# Patient Record
Sex: MALE | Race: WHITE | NOT HISPANIC OR LATINO | Employment: OTHER | URBAN - METROPOLITAN AREA
[De-identification: names, ages, dates, MRNs, and addresses within clinical notes are randomized per-mention and may not be internally consistent; named-entity substitution may affect disease eponyms.]

---

## 2023-10-20 ENCOUNTER — APPOINTMENT (OUTPATIENT)
Dept: PRIMARY CARE | Facility: CLINIC | Age: 63
End: 2023-10-20
Payer: COMMERCIAL

## 2023-12-14 ENCOUNTER — APPOINTMENT (OUTPATIENT)
Dept: PRIMARY CARE | Facility: CLINIC | Age: 63
End: 2023-12-14
Payer: COMMERCIAL

## 2023-12-21 ENCOUNTER — APPOINTMENT (OUTPATIENT)
Dept: PRIMARY CARE | Facility: CLINIC | Age: 63
End: 2023-12-21
Payer: COMMERCIAL

## 2024-01-04 ENCOUNTER — APPOINTMENT (OUTPATIENT)
Dept: PRIMARY CARE | Facility: CLINIC | Age: 64
End: 2024-01-04
Payer: COMMERCIAL

## 2024-01-09 ENCOUNTER — OFFICE VISIT (OUTPATIENT)
Dept: PRIMARY CARE | Facility: CLINIC | Age: 64
End: 2024-01-09
Payer: COMMERCIAL

## 2024-01-09 VITALS
RESPIRATION RATE: 16 BRPM | HEART RATE: 84 BPM | BODY MASS INDEX: 30.86 KG/M2 | OXYGEN SATURATION: 96 % | SYSTOLIC BLOOD PRESSURE: 134 MMHG | WEIGHT: 192 LBS | DIASTOLIC BLOOD PRESSURE: 80 MMHG | HEIGHT: 66 IN

## 2024-01-09 DIAGNOSIS — Z72.0 NICOTINE ABUSE: Primary | ICD-10-CM

## 2024-01-09 DIAGNOSIS — E78.2 MIXED HYPERLIPIDEMIA: ICD-10-CM

## 2024-01-09 DIAGNOSIS — E11.69 TYPE 2 DIABETES MELLITUS WITH OTHER SPECIFIED COMPLICATION, WITH LONG-TERM CURRENT USE OF INSULIN (MULTI): ICD-10-CM

## 2024-01-09 DIAGNOSIS — D56.3 THALASSEMIA MINOR: ICD-10-CM

## 2024-01-09 DIAGNOSIS — K21.9 GASTROESOPHAGEAL REFLUX DISEASE WITHOUT ESOPHAGITIS: ICD-10-CM

## 2024-01-09 DIAGNOSIS — E78.5 HYPERLIPIDEMIA, UNSPECIFIED HYPERLIPIDEMIA TYPE: ICD-10-CM

## 2024-01-09 DIAGNOSIS — K21.9 GASTROESOPHAGEAL REFLUX DISEASE, UNSPECIFIED WHETHER ESOPHAGITIS PRESENT: ICD-10-CM

## 2024-01-09 DIAGNOSIS — E11.69 TYPE 2 DIABETES MELLITUS WITH OTHER SPECIFIED COMPLICATION, WITHOUT LONG-TERM CURRENT USE OF INSULIN (MULTI): ICD-10-CM

## 2024-01-09 DIAGNOSIS — K63.5 POLYP OF COLON, UNSPECIFIED PART OF COLON, UNSPECIFIED TYPE: ICD-10-CM

## 2024-01-09 DIAGNOSIS — M54.12 CERVICAL RADICULOPATHY: ICD-10-CM

## 2024-01-09 DIAGNOSIS — Z79.4 TYPE 2 DIABETES MELLITUS WITH OTHER SPECIFIED COMPLICATION, WITH LONG-TERM CURRENT USE OF INSULIN (MULTI): ICD-10-CM

## 2024-01-09 DIAGNOSIS — M54.2 CERVICAL PAIN (NECK): ICD-10-CM

## 2024-01-09 DIAGNOSIS — E83.119 HEMOCHROMATOSIS, UNSPECIFIED HEMOCHROMATOSIS TYPE: ICD-10-CM

## 2024-01-09 PROBLEM — D12.6 TUBULAR ADENOMA OF COLON: Status: ACTIVE | Noted: 2024-01-09

## 2024-01-09 PROBLEM — M79.2 RADICULAR PAIN IN LEFT ARM: Status: ACTIVE | Noted: 2024-01-09

## 2024-01-09 PROBLEM — M25.561 KNEE PAIN, RIGHT: Status: ACTIVE | Noted: 2024-01-09

## 2024-01-09 PROBLEM — R35.1 NOCTURIA: Status: ACTIVE | Noted: 2024-01-09

## 2024-01-09 PROBLEM — E11.9 TYPE 2 DIABETES MELLITUS (MULTI): Status: ACTIVE | Noted: 2024-01-09

## 2024-01-09 PROBLEM — R55 SYNCOPE: Status: ACTIVE | Noted: 2024-01-09

## 2024-01-09 PROBLEM — M17.12 PRIMARY OSTEOARTHRITIS OF LEFT KNEE: Status: ACTIVE | Noted: 2024-01-09

## 2024-01-09 PROBLEM — M17.11 PRIMARY OSTEOARTHRITIS OF RIGHT KNEE: Status: ACTIVE | Noted: 2024-01-09

## 2024-01-09 PROBLEM — R05.9 COUGH: Status: ACTIVE | Noted: 2024-01-09

## 2024-01-09 PROBLEM — R20.2 PARESTHESIA: Status: ACTIVE | Noted: 2024-01-09

## 2024-01-09 PROBLEM — M19.90 OSTEOARTHRITIS: Status: ACTIVE | Noted: 2024-01-09

## 2024-01-09 PROBLEM — M79.604 PAIN OF RIGHT LOWER EXTREMITY: Status: ACTIVE | Noted: 2024-01-09

## 2024-01-09 PROBLEM — R07.81 RIB PAIN ON RIGHT SIDE: Status: ACTIVE | Noted: 2024-01-09

## 2024-01-09 PROBLEM — K76.0 FATTY (CHANGE OF) LIVER, NOT ELSEWHERE CLASSIFIED: Status: ACTIVE | Noted: 2024-01-09

## 2024-01-09 PROCEDURE — 3079F DIAST BP 80-89 MM HG: CPT | Performed by: INTERNAL MEDICINE

## 2024-01-09 PROCEDURE — 99204 OFFICE O/P NEW MOD 45 MIN: CPT | Performed by: INTERNAL MEDICINE

## 2024-01-09 PROCEDURE — 99406 BEHAV CHNG SMOKING 3-10 MIN: CPT | Performed by: INTERNAL MEDICINE

## 2024-01-09 PROCEDURE — 3075F SYST BP GE 130 - 139MM HG: CPT | Performed by: INTERNAL MEDICINE

## 2024-01-09 RX ORDER — GLIMEPIRIDE 1 MG/1
1 TABLET ORAL
COMMUNITY
End: 2024-01-09 | Stop reason: SDUPTHER

## 2024-01-09 RX ORDER — EXENATIDE 2 MG/.85ML
INJECTION, SUSPENSION, EXTENDED RELEASE SUBCUTANEOUS
COMMUNITY
End: 2024-01-09 | Stop reason: SDUPTHER

## 2024-01-09 RX ORDER — PANTOPRAZOLE SODIUM 40 MG/1
40 TABLET, DELAYED RELEASE ORAL DAILY
COMMUNITY
End: 2024-01-09 | Stop reason: SDUPTHER

## 2024-01-09 RX ORDER — BLOOD SUGAR DIAGNOSTIC
STRIP MISCELLANEOUS 2 TIMES DAILY
COMMUNITY
Start: 2020-02-12

## 2024-01-09 RX ORDER — METFORMIN HYDROCHLORIDE 500 MG/1
2000 TABLET, EXTENDED RELEASE ORAL DAILY
COMMUNITY
End: 2024-01-09 | Stop reason: SDUPTHER

## 2024-01-09 RX ORDER — ATORVASTATIN CALCIUM 10 MG/1
10 TABLET, FILM COATED ORAL DAILY
COMMUNITY
End: 2024-01-09 | Stop reason: SDUPTHER

## 2024-01-09 ASSESSMENT — PATIENT HEALTH QUESTIONNAIRE - PHQ9
SUM OF ALL RESPONSES TO PHQ9 QUESTIONS 1 & 2: 0
1. LITTLE INTEREST OR PLEASURE IN DOING THINGS: NOT AT ALL
2. FEELING DOWN, DEPRESSED OR HOPELESS: NOT AT ALL

## 2024-01-09 ASSESSMENT — SOCIAL DETERMINANTS OF HEALTH (SDOH)
WITHIN THE LAST YEAR, HAVE YOU BEEN KICKED, HIT, SLAPPED, OR OTHERWISE PHYSICALLY HURT BY YOUR PARTNER OR EX-PARTNER?: NO
WITHIN THE LAST YEAR, HAVE YOU BEEN AFRAID OF YOUR PARTNER OR EX-PARTNER?: NO
WITHIN THE LAST YEAR, HAVE YOU BEEN HUMILIATED OR EMOTIONALLY ABUSED IN OTHER WAYS BY YOUR PARTNER OR EX-PARTNER?: NO
WITHIN THE LAST YEAR, HAVE TO BEEN RAPED OR FORCED TO HAVE ANY KIND OF SEXUAL ACTIVITY BY YOUR PARTNER OR EX-PARTNER?: NO

## 2024-01-09 ASSESSMENT — PAIN SCALES - GENERAL: PAINLEVEL: 6

## 2024-01-10 RX ORDER — EXENATIDE 2 MG/.85ML
INJECTION, SUSPENSION, EXTENDED RELEASE SUBCUTANEOUS
Qty: 1 EACH | Refills: 5 | Status: SHIPPED | OUTPATIENT
Start: 2024-01-10 | End: 2024-05-13 | Stop reason: SDUPTHER

## 2024-01-10 RX ORDER — GLIMEPIRIDE 1 MG/1
1 TABLET ORAL
Qty: 90 TABLET | Refills: 2 | Status: SHIPPED | OUTPATIENT
Start: 2024-01-10 | End: 2024-10-06

## 2024-01-10 RX ORDER — PANTOPRAZOLE SODIUM 40 MG/1
40 TABLET, DELAYED RELEASE ORAL DAILY
Qty: 90 TABLET | Refills: 2 | Status: SHIPPED | OUTPATIENT
Start: 2024-01-10 | End: 2024-10-06

## 2024-01-10 RX ORDER — ATORVASTATIN CALCIUM 10 MG/1
10 TABLET, FILM COATED ORAL DAILY
Qty: 90 TABLET | Refills: 2 | Status: SHIPPED | OUTPATIENT
Start: 2024-01-10 | End: 2024-10-06

## 2024-01-10 RX ORDER — METFORMIN HYDROCHLORIDE 500 MG/1
2000 TABLET, EXTENDED RELEASE ORAL DAILY
Qty: 180 TABLET | Refills: 3 | Status: SHIPPED | OUTPATIENT
Start: 2024-01-10 | End: 2024-07-08

## 2024-01-11 DIAGNOSIS — Z00.00 ANNUAL PHYSICAL EXAM: Primary | ICD-10-CM

## 2024-01-11 RX ORDER — IBUPROFEN 200 MG
1 TABLET ORAL EVERY 24 HOURS
Qty: 30 PATCH | Refills: 0 | Status: SHIPPED | OUTPATIENT
Start: 2024-01-11 | End: 2024-02-10

## 2024-01-11 RX ORDER — IBUPROFEN 200 MG
1 TABLET ORAL EVERY 24 HOURS
Qty: 30 PATCH | Refills: 0 | Status: SHIPPED | OUTPATIENT
Start: 2024-02-11 | End: 2024-03-12

## 2024-01-11 RX ORDER — NICOTINE 7MG/24HR
1 PATCH, TRANSDERMAL 24 HOURS TRANSDERMAL EVERY 24 HOURS
Qty: 30 PATCH | Refills: 0 | Status: SHIPPED | OUTPATIENT
Start: 2024-03-13 | End: 2024-04-12

## 2024-01-11 RX ORDER — DM/P-EPHED/ACETAMINOPH/DOXYLAM 30-7.5/3
2 LIQUID (ML) ORAL AS NEEDED
Qty: 100 LOZENGE | Refills: 0 | Status: SHIPPED | OUTPATIENT
Start: 2024-01-11 | End: 2024-02-10

## 2024-01-11 NOTE — PROGRESS NOTES
"Khris Kennedy is a 63 y.o. male who presents for New Patient Visit.  Patient presents to [facility].  He was previously seeing Dr. Linn.  Patient complains of left shoulder pain along with tingling in his left hand.  No injuries or accidents.  He does have associated neck pain.  Discussed that patient may have radicular symptoms from his cervical spine.  Discussed options.  Patient would like to try physical therapy.  Patient has a history of diabetes mellitus type 2.  He does not recall his last hemoglobin A1c.  Patient's blood glucose has been 150-160 fasting.    He had a colonoscopy in 2023.  He gets them every 3 years.        Objective     /80 (BP Location: Right arm, Patient Position: Sitting, BP Cuff Size: Adult)   Pulse 84   Resp 16   Ht 1.676 m (5' 6\")   Wt 87.1 kg (192 lb)   SpO2 96%   BMI 30.99 kg/m²      Physical Exam  Constitutional:       Appearance: Normal appearance.   HENT:      Head: Normocephalic and atraumatic.      Nose: Nose normal.      Mouth/Throat:      Mouth: Mucous membranes are moist.      Pharynx: Oropharynx is clear.   Eyes:      Extraocular Movements: Extraocular movements intact.      Pupils: Pupils are equal, round, and reactive to light.   Cardiovascular:      Rate and Rhythm: Normal rate and regular rhythm.   Pulmonary:      Effort: No respiratory distress.      Breath sounds: No wheezing, rhonchi or rales.      Comments: Diminished breath sounds throughout  Abdominal:      General: Bowel sounds are normal. There is no distension.      Palpations: Abdomen is soft.      Tenderness: There is no abdominal tenderness. There is no guarding.   Musculoskeletal:      Right lower leg: No edema.      Left lower leg: No edema.   Skin:     General: Skin is warm and dry.   Neurological:      Mental Status: He is alert and oriented to person, place, and time. Mental status is at baseline.   Psychiatric:         Mood and Affect: Mood normal.         Behavior: Behavior " normal.         Assessment/Plan   Problem List Items Addressed This Visit       Cervical pain (neck)     Referral to physical therapy         Cervical radiculopathy     Referral to physical therapy         Colon polyps     Continue colonoscopies every 3 years         Esophageal reflux     Continue pantoprazole         Hemochromatosis     Monitor blood work         Hyperlipidemia     Continue atorvastatin         Thalassemia minor     Monitor blood work         Type 2 diabetes mellitus (CMS/HCC)     Continue current medications          Other Visit Diagnoses       Nicotine abuse    -  Primary    Relevant Medications    nicotine (Nicoderm CQ) 21 mg/24 hr patch    nicotine (Nicoderm CQ) 14 mg/24 hr patch (Start on 2/11/2024)    nicotine (Nicoderm CQ) 7 mg/24 hr patch (Start on 3/13/2024)    Other Relevant Orders    Referral to Physical Therapy          Smoking cessation discussed for 7 minutes.  Patient would like to try nicotine patch and gum  Return in 4 months for physical       Rohan Castillo,

## 2024-05-03 ENCOUNTER — LAB (OUTPATIENT)
Dept: LAB | Facility: LAB | Age: 64
End: 2024-05-03
Payer: COMMERCIAL

## 2024-05-03 DIAGNOSIS — Z00.00 ANNUAL PHYSICAL EXAM: ICD-10-CM

## 2024-05-03 LAB
25(OH)D3 SERPL-MCNC: 22 NG/ML (ref 30–100)
ALBUMIN SERPL BCP-MCNC: 4.4 G/DL (ref 3.4–5)
ALP SERPL-CCNC: 56 U/L (ref 33–136)
ALT SERPL W P-5'-P-CCNC: 22 U/L (ref 10–52)
ANION GAP SERPL CALC-SCNC: 13 MMOL/L (ref 10–20)
AST SERPL W P-5'-P-CCNC: 21 U/L (ref 9–39)
BILIRUB SERPL-MCNC: 0.7 MG/DL (ref 0–1.2)
BUN SERPL-MCNC: 13 MG/DL (ref 6–23)
CALCIUM SERPL-MCNC: 8.9 MG/DL (ref 8.6–10.3)
CHLORIDE SERPL-SCNC: 106 MMOL/L (ref 98–107)
CHOLEST SERPL-MCNC: 115 MG/DL (ref 0–199)
CHOLESTEROL/HDL RATIO: 2.2
CO2 SERPL-SCNC: 25 MMOL/L (ref 21–32)
CREAT SERPL-MCNC: 0.79 MG/DL (ref 0.5–1.3)
EGFRCR SERPLBLD CKD-EPI 2021: >90 ML/MIN/1.73M*2
ERYTHROCYTE [DISTWIDTH] IN BLOOD BY AUTOMATED COUNT: 20.1 % (ref 11.5–14.5)
EST. AVERAGE GLUCOSE BLD GHB EST-MCNC: 131 MG/DL
GLUCOSE SERPL-MCNC: 143 MG/DL (ref 74–99)
HBA1C MFR BLD: 6.2 %
HCT VFR BLD AUTO: 36.8 % (ref 41–52)
HDLC SERPL-MCNC: 51.3 MG/DL
HGB BLD-MCNC: 10.9 G/DL (ref 13.5–17.5)
LDLC SERPL CALC-MCNC: 54 MG/DL
MCH RBC QN AUTO: 17.7 PG (ref 26–34)
MCHC RBC AUTO-ENTMCNC: 29.6 G/DL (ref 32–36)
MCV RBC AUTO: 60 FL (ref 80–100)
NON HDL CHOLESTEROL: 64 MG/DL (ref 0–149)
NRBC BLD-RTO: 0.4 /100 WBCS (ref 0–0)
PLATELET # BLD AUTO: 173 X10*3/UL (ref 150–450)
POTASSIUM SERPL-SCNC: 4.1 MMOL/L (ref 3.5–5.3)
PROT SERPL-MCNC: 6.6 G/DL (ref 6.4–8.2)
PSA SERPL-MCNC: 1.19 NG/ML
RBC # BLD AUTO: 6.16 X10*6/UL (ref 4.5–5.9)
SODIUM SERPL-SCNC: 140 MMOL/L (ref 136–145)
TRIGL SERPL-MCNC: 50 MG/DL (ref 0–149)
TSH SERPL-ACNC: 1.88 MIU/L (ref 0.44–3.98)
VLDL: 10 MG/DL (ref 0–40)
WBC # BLD AUTO: 5.2 X10*3/UL (ref 4.4–11.3)

## 2024-05-03 PROCEDURE — 83036 HEMOGLOBIN GLYCOSYLATED A1C: CPT

## 2024-05-03 PROCEDURE — 85027 COMPLETE CBC AUTOMATED: CPT

## 2024-05-03 PROCEDURE — 36415 COLL VENOUS BLD VENIPUNCTURE: CPT

## 2024-05-03 PROCEDURE — 80061 LIPID PANEL: CPT

## 2024-05-03 PROCEDURE — 84443 ASSAY THYROID STIM HORMONE: CPT

## 2024-05-03 PROCEDURE — 82306 VITAMIN D 25 HYDROXY: CPT

## 2024-05-03 PROCEDURE — 84153 ASSAY OF PSA TOTAL: CPT

## 2024-05-03 PROCEDURE — 80053 COMPREHEN METABOLIC PANEL: CPT

## 2024-05-09 ENCOUNTER — OFFICE VISIT (OUTPATIENT)
Dept: PRIMARY CARE | Facility: CLINIC | Age: 64
End: 2024-05-09
Payer: COMMERCIAL

## 2024-05-09 VITALS
BODY MASS INDEX: 29.98 KG/M2 | RESPIRATION RATE: 16 BRPM | WEIGHT: 191 LBS | HEIGHT: 67 IN | SYSTOLIC BLOOD PRESSURE: 134 MMHG | DIASTOLIC BLOOD PRESSURE: 86 MMHG | HEART RATE: 68 BPM | OXYGEN SATURATION: 97 %

## 2024-05-09 DIAGNOSIS — K63.5 POLYP OF COLON, UNSPECIFIED PART OF COLON, UNSPECIFIED TYPE: ICD-10-CM

## 2024-05-09 DIAGNOSIS — Z12.2 SCREENING FOR LUNG CANCER: ICD-10-CM

## 2024-05-09 DIAGNOSIS — E83.119 HEMOCHROMATOSIS, UNSPECIFIED HEMOCHROMATOSIS TYPE: ICD-10-CM

## 2024-05-09 DIAGNOSIS — D56.3 THALASSEMIA MINOR: ICD-10-CM

## 2024-05-09 DIAGNOSIS — E55.9 VITAMIN D DEFICIENCY: Primary | ICD-10-CM

## 2024-05-09 DIAGNOSIS — E78.2 MIXED HYPERLIPIDEMIA: ICD-10-CM

## 2024-05-09 DIAGNOSIS — E11.69 TYPE 2 DIABETES MELLITUS WITH OTHER SPECIFIED COMPLICATION, WITHOUT LONG-TERM CURRENT USE OF INSULIN (MULTI): ICD-10-CM

## 2024-05-09 DIAGNOSIS — R09.81 CONGESTION OF NASAL SINUS: ICD-10-CM

## 2024-05-09 PROCEDURE — 99396 PREV VISIT EST AGE 40-64: CPT | Performed by: INTERNAL MEDICINE

## 2024-05-09 PROCEDURE — 3044F HG A1C LEVEL LT 7.0%: CPT | Performed by: INTERNAL MEDICINE

## 2024-05-09 PROCEDURE — 3075F SYST BP GE 130 - 139MM HG: CPT | Performed by: INTERNAL MEDICINE

## 2024-05-09 PROCEDURE — 3079F DIAST BP 80-89 MM HG: CPT | Performed by: INTERNAL MEDICINE

## 2024-05-09 PROCEDURE — 99214 OFFICE O/P EST MOD 30 MIN: CPT | Performed by: INTERNAL MEDICINE

## 2024-05-09 PROCEDURE — 3048F LDL-C <100 MG/DL: CPT | Performed by: INTERNAL MEDICINE

## 2024-05-09 RX ORDER — FLUTICASONE FUROATE 27.5 UG/1
1 SPRAY, METERED NASAL
Qty: 10 G | Refills: 5 | Status: SHIPPED | OUTPATIENT
Start: 2024-05-09 | End: 2025-05-09

## 2024-05-09 RX ORDER — VIT C/E/ZN/COPPR/LUTEIN/ZEAXAN 250MG-90MG
25 CAPSULE ORAL DAILY
Qty: 90 CAPSULE | Refills: 3 | Status: SHIPPED | OUTPATIENT
Start: 2024-05-09 | End: 2025-05-09

## 2024-05-10 NOTE — PROGRESS NOTES
"Khris Kennedy is a 63 y.o. male who presents for No chief complaint on file..  Patient presents for his yearly physical.  He has no acute complaints.  Lab work reviewed with patient.  His hemoglobin A1c is 6.2%.  He is compliant with his medications.  ASCVD calculated at 14.1%.  Despite lipids being well-controlled, patient does require statin.  Patient's last colonoscopy was in 2023 and showed multiple polyps.  He needs a repeat in 2026.  He has no acute issues or complaints.  No issues with bowels or bladder.  No issues with sleep.  Patient quit smoking since his last visit.        Objective     /86 (BP Location: Right arm, Patient Position: Sitting, BP Cuff Size: Adult)   Pulse 68   Resp 16   Ht 1.702 m (5' 7\")   Wt 86.6 kg (191 lb)   SpO2 97%   BMI 29.91 kg/m²      Physical Exam  Constitutional:       Appearance: Normal appearance.   HENT:      Head: Normocephalic and atraumatic.      Nose: Nose normal.      Mouth/Throat:      Mouth: Mucous membranes are moist.      Pharynx: Oropharynx is clear.   Eyes:      Extraocular Movements: Extraocular movements intact.      Pupils: Pupils are equal, round, and reactive to light.   Cardiovascular:      Rate and Rhythm: Normal rate and regular rhythm.   Pulmonary:      Effort: No respiratory distress.      Breath sounds: No wheezing, rhonchi or rales.      Comments: Diminished breath sounds throughout  Abdominal:      General: Bowel sounds are normal. There is no distension.      Palpations: Abdomen is soft.      Tenderness: There is no abdominal tenderness. There is no guarding.   Musculoskeletal:      Right lower leg: No edema.      Left lower leg: No edema.   Skin:     General: Skin is warm and dry.   Neurological:      Mental Status: He is alert and oriented to person, place, and time. Mental status is at baseline.   Psychiatric:         Mood and Affect: Mood normal.         Behavior: Behavior normal.     Assessment/Plan   Problem List Items " Addressed This Visit       Colon polyps     Repeat colonoscopy 2026         Hemochromatosis    Relevant Orders    CBC    Hyperlipidemia     Well controlled  Continue statin due to ASCVD         Thalassemia minor     Hemoglobin stable, continue to monitor  Continue current medications         Type 2 diabetes mellitus (Multi)     Well-controlled  Continue current medications         Relevant Orders    Hemoglobin A1C     Other Visit Diagnoses       Vitamin D deficiency    -  Primary    Relevant Medications    cholecalciferol (Vitamin D-3) 25 MCG (1000 UT) capsule    Screening for lung cancer        Relevant Orders    CT lung screening low dose    Congestion of nasal sinus        Relevant Medications    fluticasone (Flonase Sensimist) 27.5 mcg/actuation nasal spray          Will obtain a CT chest for lung cancer screening  Continue current medications  Start vitamin D  Return in 6 months for repeat CBC and hemoglobin A1c       Rohan Castillo, DO

## 2024-05-13 DIAGNOSIS — Z79.4 TYPE 2 DIABETES MELLITUS WITH OTHER SPECIFIED COMPLICATION, WITH LONG-TERM CURRENT USE OF INSULIN (MULTI): ICD-10-CM

## 2024-05-13 DIAGNOSIS — E11.69 TYPE 2 DIABETES MELLITUS WITH OTHER SPECIFIED COMPLICATION, WITH LONG-TERM CURRENT USE OF INSULIN (MULTI): ICD-10-CM

## 2024-05-13 RX ORDER — EXENATIDE 2 MG/.85ML
INJECTION, SUSPENSION, EXTENDED RELEASE SUBCUTANEOUS
Qty: 1 EACH | Refills: 5 | Status: SHIPPED | OUTPATIENT
Start: 2024-05-13 | End: 2024-05-15 | Stop reason: SDUPTHER

## 2024-05-15 DIAGNOSIS — Z79.4 TYPE 2 DIABETES MELLITUS WITH OTHER SPECIFIED COMPLICATION, WITH LONG-TERM CURRENT USE OF INSULIN (MULTI): ICD-10-CM

## 2024-05-15 DIAGNOSIS — E11.69 TYPE 2 DIABETES MELLITUS WITH OTHER SPECIFIED COMPLICATION, WITH LONG-TERM CURRENT USE OF INSULIN (MULTI): ICD-10-CM

## 2024-05-15 RX ORDER — EXENATIDE 2 MG/.85ML
INJECTION, SUSPENSION, EXTENDED RELEASE SUBCUTANEOUS
Qty: 1 EACH | Refills: 5 | Status: SHIPPED | OUTPATIENT
Start: 2024-05-15

## 2024-05-28 ENCOUNTER — APPOINTMENT (OUTPATIENT)
Dept: RADIOLOGY | Facility: CLINIC | Age: 64
End: 2024-05-28
Payer: COMMERCIAL

## 2024-06-07 ENCOUNTER — APPOINTMENT (OUTPATIENT)
Dept: RADIOLOGY | Facility: CLINIC | Age: 64
End: 2024-06-07
Payer: COMMERCIAL

## 2024-06-14 ENCOUNTER — APPOINTMENT (OUTPATIENT)
Dept: RADIOLOGY | Facility: CLINIC | Age: 64
End: 2024-06-14
Payer: COMMERCIAL

## 2024-07-05 ENCOUNTER — APPOINTMENT (OUTPATIENT)
Dept: RADIOLOGY | Facility: CLINIC | Age: 64
End: 2024-07-05
Payer: COMMERCIAL

## 2024-07-17 ENCOUNTER — HOSPITAL ENCOUNTER (OUTPATIENT)
Dept: RADIOLOGY | Facility: CLINIC | Age: 64
Discharge: HOME | End: 2024-07-17
Payer: COMMERCIAL

## 2024-07-17 DIAGNOSIS — Z12.2 SCREENING FOR LUNG CANCER: ICD-10-CM

## 2024-07-17 PROCEDURE — 71271 CT THORAX LUNG CANCER SCR C-: CPT

## 2024-09-05 DIAGNOSIS — K21.9 GASTROESOPHAGEAL REFLUX DISEASE, UNSPECIFIED WHETHER ESOPHAGITIS PRESENT: ICD-10-CM

## 2024-09-05 DIAGNOSIS — E78.5 HYPERLIPIDEMIA, UNSPECIFIED HYPERLIPIDEMIA TYPE: ICD-10-CM

## 2024-09-06 RX ORDER — PANTOPRAZOLE SODIUM 40 MG/1
40 TABLET, DELAYED RELEASE ORAL DAILY
Qty: 90 TABLET | Refills: 3 | Status: SHIPPED | OUTPATIENT
Start: 2024-09-06

## 2024-09-06 RX ORDER — ATORVASTATIN CALCIUM 10 MG/1
10 TABLET, FILM COATED ORAL DAILY
Qty: 90 TABLET | Refills: 3 | Status: SHIPPED | OUTPATIENT
Start: 2024-09-06 | End: 2025-09-01

## 2024-09-12 DIAGNOSIS — Z79.4 TYPE 2 DIABETES MELLITUS WITH OTHER SPECIFIED COMPLICATION, WITH LONG-TERM CURRENT USE OF INSULIN (MULTI): ICD-10-CM

## 2024-09-12 DIAGNOSIS — E11.69 TYPE 2 DIABETES MELLITUS WITH OTHER SPECIFIED COMPLICATION, WITH LONG-TERM CURRENT USE OF INSULIN (MULTI): ICD-10-CM

## 2024-09-13 RX ORDER — METFORMIN HYDROCHLORIDE 500 MG/1
2000 TABLET, EXTENDED RELEASE ORAL DAILY
Qty: 180 TABLET | Refills: 3 | Status: SHIPPED | OUTPATIENT
Start: 2024-09-13 | End: 2025-03-12

## 2024-10-17 DIAGNOSIS — K21.9 GASTROESOPHAGEAL REFLUX DISEASE, UNSPECIFIED WHETHER ESOPHAGITIS PRESENT: ICD-10-CM

## 2024-10-17 DIAGNOSIS — E78.5 HYPERLIPIDEMIA, UNSPECIFIED HYPERLIPIDEMIA TYPE: ICD-10-CM

## 2024-10-17 DIAGNOSIS — Z79.4 TYPE 2 DIABETES MELLITUS WITH OTHER SPECIFIED COMPLICATION, WITH LONG-TERM CURRENT USE OF INSULIN: ICD-10-CM

## 2024-10-17 DIAGNOSIS — E11.69 TYPE 2 DIABETES MELLITUS WITH OTHER SPECIFIED COMPLICATION, WITH LONG-TERM CURRENT USE OF INSULIN: ICD-10-CM

## 2024-10-18 RX ORDER — ATORVASTATIN CALCIUM 10 MG/1
10 TABLET, FILM COATED ORAL DAILY
Qty: 90 TABLET | Refills: 3 | Status: SHIPPED | OUTPATIENT
Start: 2024-10-18 | End: 2025-10-13

## 2024-10-18 RX ORDER — METFORMIN HYDROCHLORIDE 500 MG/1
2000 TABLET, EXTENDED RELEASE ORAL DAILY
Qty: 120 TABLET | Refills: 11 | Status: SHIPPED | OUTPATIENT
Start: 2024-10-18 | End: 2025-10-13

## 2024-10-18 RX ORDER — GLIMEPIRIDE 1 MG/1
1 TABLET ORAL
Qty: 90 TABLET | Refills: 3 | Status: SHIPPED | OUTPATIENT
Start: 2024-10-18 | End: 2025-10-13

## 2024-10-18 RX ORDER — EXENATIDE 2 MG/.85ML
INJECTION, SUSPENSION, EXTENDED RELEASE SUBCUTANEOUS
Qty: 1 EACH | Refills: 5 | Status: SHIPPED | OUTPATIENT
Start: 2024-10-18

## 2024-10-18 RX ORDER — PANTOPRAZOLE SODIUM 40 MG/1
40 TABLET, DELAYED RELEASE ORAL DAILY
Qty: 90 TABLET | Refills: 3 | Status: SHIPPED | OUTPATIENT
Start: 2024-10-18

## 2024-11-04 ENCOUNTER — LAB (OUTPATIENT)
Dept: LAB | Facility: LAB | Age: 64
End: 2024-11-04
Payer: COMMERCIAL

## 2024-11-04 DIAGNOSIS — E83.119 HEMOCHROMATOSIS, UNSPECIFIED HEMOCHROMATOSIS TYPE: ICD-10-CM

## 2024-11-04 DIAGNOSIS — E11.69 TYPE 2 DIABETES MELLITUS WITH OTHER SPECIFIED COMPLICATION, WITHOUT LONG-TERM CURRENT USE OF INSULIN: ICD-10-CM

## 2024-11-04 LAB
ERYTHROCYTE [DISTWIDTH] IN BLOOD BY AUTOMATED COUNT: 19.8 % (ref 11.5–14.5)
HCT VFR BLD AUTO: 36.3 % (ref 41–52)
HGB BLD-MCNC: 10.9 G/DL (ref 13.5–17.5)
MCH RBC QN AUTO: 17.9 PG (ref 26–34)
MCHC RBC AUTO-ENTMCNC: 30 G/DL (ref 32–36)
MCV RBC AUTO: 60 FL (ref 80–100)
NRBC BLD-RTO: 0 /100 WBCS (ref 0–0)
PLATELET # BLD AUTO: 144 X10*3/UL (ref 150–450)
RBC # BLD AUTO: 6.09 X10*6/UL (ref 4.5–5.9)
WBC # BLD AUTO: 6.8 X10*3/UL (ref 4.4–11.3)

## 2024-11-04 PROCEDURE — 83036 HEMOGLOBIN GLYCOSYLATED A1C: CPT

## 2024-11-04 PROCEDURE — 36415 COLL VENOUS BLD VENIPUNCTURE: CPT

## 2024-11-04 PROCEDURE — 85027 COMPLETE CBC AUTOMATED: CPT

## 2024-11-05 LAB
EST. AVERAGE GLUCOSE BLD GHB EST-MCNC: 148 MG/DL
HBA1C MFR BLD: 6.8 %

## 2024-11-12 ENCOUNTER — APPOINTMENT (OUTPATIENT)
Dept: PRIMARY CARE | Facility: CLINIC | Age: 64
End: 2024-11-12
Payer: COMMERCIAL

## 2024-11-12 VITALS
RESPIRATION RATE: 14 BRPM | HEART RATE: 87 BPM | OXYGEN SATURATION: 99 % | SYSTOLIC BLOOD PRESSURE: 156 MMHG | BODY MASS INDEX: 29.91 KG/M2 | WEIGHT: 191 LBS | DIASTOLIC BLOOD PRESSURE: 80 MMHG

## 2024-11-12 DIAGNOSIS — Z79.4 TYPE 2 DIABETES MELLITUS WITH OTHER SPECIFIED COMPLICATION, WITH LONG-TERM CURRENT USE OF INSULIN: ICD-10-CM

## 2024-11-12 DIAGNOSIS — D50.8 OTHER IRON DEFICIENCY ANEMIA: ICD-10-CM

## 2024-11-12 DIAGNOSIS — E11.69 TYPE 2 DIABETES MELLITUS WITH OTHER SPECIFIED COMPLICATION, WITH LONG-TERM CURRENT USE OF INSULIN: ICD-10-CM

## 2024-11-12 DIAGNOSIS — R68.89 FORGETFULNESS: Primary | ICD-10-CM

## 2024-11-12 PROCEDURE — 3044F HG A1C LEVEL LT 7.0%: CPT | Performed by: INTERNAL MEDICINE

## 2024-11-12 PROCEDURE — 3077F SYST BP >= 140 MM HG: CPT | Performed by: INTERNAL MEDICINE

## 2024-11-12 PROCEDURE — 99214 OFFICE O/P EST MOD 30 MIN: CPT | Performed by: INTERNAL MEDICINE

## 2024-11-12 PROCEDURE — 3079F DIAST BP 80-89 MM HG: CPT | Performed by: INTERNAL MEDICINE

## 2024-11-12 PROCEDURE — 3048F LDL-C <100 MG/DL: CPT | Performed by: INTERNAL MEDICINE

## 2024-11-12 RX ORDER — EXENATIDE 2 MG/.85ML
INJECTION, SUSPENSION, EXTENDED RELEASE SUBCUTANEOUS
Qty: 1 EACH | Refills: 11 | Status: SHIPPED | OUTPATIENT
Start: 2024-11-12

## 2024-11-12 RX ORDER — METFORMIN HYDROCHLORIDE 500 MG/1
2000 TABLET, EXTENDED RELEASE ORAL DAILY
Qty: 120 TABLET | Refills: 11 | Status: SHIPPED | OUTPATIENT
Start: 2024-11-12 | End: 2025-11-07

## 2024-11-12 ASSESSMENT — ENCOUNTER SYMPTOMS
LOSS OF SENSATION IN FEET: 0
OCCASIONAL FEELINGS OF UNSTEADINESS: 0
DEPRESSION: 0

## 2024-11-12 ASSESSMENT — PAIN SCALES - GENERAL: PAINLEVEL_OUTOF10: 0-NO PAIN

## 2024-11-23 NOTE — PROGRESS NOTES
"Subjective   Nicole Kennedy is a 64 y.o. male who presents for Follow-up (Pt is being seen for follow up visit ).  Patient presents for follow up of lab work.  Labs reviewed with patient.  Patient complains that \"sometimes I forget everything.\"  She denies depression.        Objective     /80   Pulse 87   Resp 14   Wt 86.6 kg (191 lb)   SpO2 99%   BMI 29.91 kg/m²      Physical Exam  Constitutional:       Appearance: Normal appearance.   HENT:      Head: Normocephalic and atraumatic.      Nose: Nose normal.      Mouth/Throat:      Mouth: Mucous membranes are moist.      Pharynx: Oropharynx is clear.   Eyes:      Extraocular Movements: Extraocular movements intact.      Pupils: Pupils are equal, round, and reactive to light.   Cardiovascular:      Rate and Rhythm: Normal rate and regular rhythm.   Pulmonary:      Effort: No respiratory distress.      Breath sounds: No wheezing, rhonchi or rales.      Comments: Diminished breath sounds throughout  Abdominal:      General: Bowel sounds are normal. There is no distension.      Palpations: Abdomen is soft.      Tenderness: There is no abdominal tenderness. There is no guarding.   Musculoskeletal:      Right lower leg: No edema.      Left lower leg: No edema.   Skin:     General: Skin is warm and dry.   Neurological:      Mental Status: He is alert and oriented to person, place, and time. Mental status is at baseline.   Psychiatric:         Mood and Affect: Mood normal.         Behavior: Behavior normal.     Assessment/Plan   Problem List Items Addressed This Visit       Type 2 diabetes mellitus    Relevant Medications    Bydureon BCise 2 mg/0.85 mL auto-injector    metFORMIN  mg 24 hr tablet    Other Relevant Orders    Hemoglobin A1C     Other Visit Diagnoses       Forgetfulness    -  Primary    Relevant Orders    Vitamin B12    Methylmalonic acid, serum    Folate    Syphilis Screen with Reflex    CBC    Other iron deficiency anemia        Relevant Orders "    Iron and TIBC    Ferritin          Follow up in 3 months       Rohan Castillo DO

## 2024-11-26 ENCOUNTER — LAB (OUTPATIENT)
Dept: LAB | Facility: LAB | Age: 64
End: 2024-11-26
Payer: COMMERCIAL

## 2024-11-26 DIAGNOSIS — D50.8 OTHER IRON DEFICIENCY ANEMIA: ICD-10-CM

## 2024-11-26 DIAGNOSIS — Z79.4 TYPE 2 DIABETES MELLITUS WITH OTHER SPECIFIED COMPLICATION, WITH LONG-TERM CURRENT USE OF INSULIN: ICD-10-CM

## 2024-11-26 DIAGNOSIS — R68.89 FORGETFULNESS: ICD-10-CM

## 2024-11-26 DIAGNOSIS — E11.69 TYPE 2 DIABETES MELLITUS WITH OTHER SPECIFIED COMPLICATION, WITH LONG-TERM CURRENT USE OF INSULIN: ICD-10-CM

## 2024-11-26 LAB
ERYTHROCYTE [DISTWIDTH] IN BLOOD BY AUTOMATED COUNT: 20.5 % (ref 11.5–14.5)
EST. AVERAGE GLUCOSE BLD GHB EST-MCNC: 137 MG/DL
HBA1C MFR BLD: 6.4 %
HCT VFR BLD AUTO: 40.1 % (ref 41–52)
HGB BLD-MCNC: 12.1 G/DL (ref 13.5–17.5)
IRON SATN MFR SERPL: 40 % (ref 25–45)
IRON SERPL-MCNC: 126 UG/DL (ref 35–150)
MCH RBC QN AUTO: 17.9 PG (ref 26–34)
MCHC RBC AUTO-ENTMCNC: 30.2 G/DL (ref 32–36)
MCV RBC AUTO: 59 FL (ref 80–100)
NRBC BLD-RTO: 0.4 /100 WBCS (ref 0–0)
PLATELET # BLD AUTO: 128 X10*3/UL (ref 150–450)
RBC # BLD AUTO: 6.77 X10*6/UL (ref 4.5–5.9)
TIBC SERPL-MCNC: 313 UG/DL (ref 240–445)
UIBC SERPL-MCNC: 187 UG/DL (ref 110–370)
WBC # BLD AUTO: 7 X10*3/UL (ref 4.4–11.3)

## 2024-11-26 PROCEDURE — 36415 COLL VENOUS BLD VENIPUNCTURE: CPT

## 2024-11-26 PROCEDURE — 83036 HEMOGLOBIN GLYCOSYLATED A1C: CPT

## 2024-11-26 PROCEDURE — 86780 TREPONEMA PALLIDUM: CPT

## 2024-11-26 PROCEDURE — 83921 ORGANIC ACID SINGLE QUANT: CPT

## 2024-11-26 PROCEDURE — 83540 ASSAY OF IRON: CPT

## 2024-11-26 PROCEDURE — 83550 IRON BINDING TEST: CPT

## 2024-11-26 PROCEDURE — 82728 ASSAY OF FERRITIN: CPT

## 2024-11-26 PROCEDURE — 82746 ASSAY OF FOLIC ACID SERUM: CPT

## 2024-11-26 PROCEDURE — 82607 VITAMIN B-12: CPT

## 2024-11-26 PROCEDURE — 85027 COMPLETE CBC AUTOMATED: CPT

## 2024-11-27 LAB
FERRITIN SERPL-MCNC: 628 NG/ML (ref 20–300)
FOLATE SERPL-MCNC: 11.2 NG/ML
TREPONEMA PALLIDUM IGG+IGM AB [PRESENCE] IN SERUM OR PLASMA BY IMMUNOASSAY: NONREACTIVE
VIT B12 SERPL-MCNC: 416 PG/ML (ref 211–911)

## 2024-11-30 LAB — METHYLMALONATE SERPL-SCNC: 0.15 UMOL/L (ref 0–0.4)

## 2024-12-11 ENCOUNTER — OFFICE VISIT (OUTPATIENT)
Dept: ORTHOPEDIC SURGERY | Facility: CLINIC | Age: 64
End: 2024-12-11
Payer: COMMERCIAL

## 2024-12-11 ENCOUNTER — APPOINTMENT (OUTPATIENT)
Dept: ORTHOPEDIC SURGERY | Facility: CLINIC | Age: 64
End: 2024-12-11
Payer: COMMERCIAL

## 2024-12-11 ENCOUNTER — HOSPITAL ENCOUNTER (OUTPATIENT)
Dept: RADIOLOGY | Facility: CLINIC | Age: 64
Discharge: HOME | End: 2024-12-11
Payer: COMMERCIAL

## 2024-12-11 VITALS — HEIGHT: 66 IN | WEIGHT: 191 LBS | BODY MASS INDEX: 30.7 KG/M2

## 2024-12-11 DIAGNOSIS — M25.512 ACUTE PAIN OF LEFT SHOULDER: ICD-10-CM

## 2024-12-11 DIAGNOSIS — M75.42 IMPINGEMENT SYNDROME OF LEFT SHOULDER: Primary | ICD-10-CM

## 2024-12-11 DIAGNOSIS — M75.82 ROTATOR CUFF TENDONITIS, LEFT: ICD-10-CM

## 2024-12-11 PROCEDURE — 3048F LDL-C <100 MG/DL: CPT | Performed by: FAMILY MEDICINE

## 2024-12-11 PROCEDURE — 3008F BODY MASS INDEX DOCD: CPT | Performed by: FAMILY MEDICINE

## 2024-12-11 PROCEDURE — 73030 X-RAY EXAM OF SHOULDER: CPT | Mod: LT

## 2024-12-11 PROCEDURE — 3044F HG A1C LEVEL LT 7.0%: CPT | Performed by: FAMILY MEDICINE

## 2024-12-11 PROCEDURE — 2500000004 HC RX 250 GENERAL PHARMACY W/ HCPCS (ALT 636 FOR OP/ED): Performed by: FAMILY MEDICINE

## 2024-12-11 PROCEDURE — 99213 OFFICE O/P EST LOW 20 MIN: CPT | Mod: 25 | Performed by: FAMILY MEDICINE

## 2024-12-11 PROCEDURE — 20610 DRAIN/INJ JOINT/BURSA W/O US: CPT | Mod: LT | Performed by: FAMILY MEDICINE

## 2024-12-11 PROCEDURE — 73030 X-RAY EXAM OF SHOULDER: CPT | Mod: LEFT SIDE | Performed by: RADIOLOGY

## 2024-12-11 PROCEDURE — 99203 OFFICE O/P NEW LOW 30 MIN: CPT | Performed by: FAMILY MEDICINE

## 2024-12-11 RX ORDER — LIDOCAINE HYDROCHLORIDE 20 MG/ML
2 INJECTION, SOLUTION INFILTRATION; PERINEURAL
Status: COMPLETED | OUTPATIENT
Start: 2024-12-11 | End: 2024-12-11

## 2024-12-11 RX ORDER — TRIAMCINOLONE ACETONIDE 40 MG/ML
40 INJECTION, SUSPENSION INTRA-ARTICULAR; INTRAMUSCULAR
Status: COMPLETED | OUTPATIENT
Start: 2024-12-11 | End: 2024-12-11

## 2024-12-11 NOTE — PROGRESS NOTES
History of Present Illness   Chief Complaint   Patient presents with    Left Shoulder - Pain     Pain for many years       The patient is 64 y.o. right-hand-dominant male  here with a complaint of left shoulder pain.  Onset of symptoms over the past 3 to 4 months, atraumatic in nature.  Patient with history of some more intermittent chronic left shoulder pain, had 2 prior shoulder arthroscopies which she thinks was greater than 10 years ago, did well after this, does not recall rotator cuff surgery, says that he had some bone spurs removed.  Over the past several years he has had some intermittent flareups of pain that responded well to conservative treatment.  He has been dealing with more consistent pain in his left shoulder for the past few months, he has pain with attempts at overhead activity, he feels like there is limited range of motion in his left shoulder.  He has pain laying on his shoulder at night.  He takes over-the-counter medications as needed for pain.  Patient is retired, used to work in construction.    Past Medical History:   Diagnosis Date    Personal history of other diseases of the respiratory system 10/24/2015    History of acute bronchitis    Type 2 diabetes mellitus without complications (Multi) 12/01/2022    Type 2 diabetes mellitus       Medication Documentation Review Audit       Reviewed by Karma Islas MA (Technologist) on 12/11/24 at 1554      Medication Order Taking? Sig Documenting Provider Last Dose Status   atorvastatin (Lipitor) 10 mg tablet [id number]  Take 1 tablet (10 mg) by mouth once daily. Rohan Castillo DO  Active   blood sugar diagnostic (CareSens N Test Strips) strip [id number]  twice a day. Historical Provider, MD  Active   Bydureon BCise 2 mg/0.85 mL auto-injector [id number]  inject 2 milligram subcutaneously every 7 days Rohan Castillo, DO  Active   cholecalciferol (Vitamin D-3) 25 MCG (1000 UT) capsule [id number]  Take 1 capsule (25 mcg) by mouth once  daily.   Patient not taking: Reported on 11/12/2024    Rohan Castillo DO  Active   fluticasone (Flonase Sensimist) 27.5 mcg/actuation nasal spray [id number]  Administer 1 spray into each nostril once daily.   Patient not taking: Reported on 11/12/2024    Rohan Castillo DO  Active   glimepiride (Amaryl) 1 mg tablet [id number]  Take 1 tablet (1 mg) by mouth once daily with breakfast. Rohan Castillo DO  Active   metFORMIN  mg 24 hr tablet [id number]  Take 4 tablets (2,000 mg) by mouth once daily. Rohan Castillo DO  Active   pantoprazole (ProtoNix) 40 mg EC tablet [id number]  Take 1 tablet (40 mg) by mouth once daily. Rohan Castillo DO  Active                    No Known Allergies    Social History     Socioeconomic History    Marital status:      Spouse name: Not on file    Number of children: Not on file    Years of education: Not on file    Highest education level: Not on file   Occupational History    Not on file   Tobacco Use    Smoking status: Every Day     Current packs/day: 1.00     Average packs/day: 1 pack/day for 45.9 years (45.9 ttl pk-yrs)     Types: Cigarettes     Start date: 1979    Smokeless tobacco: Never   Substance and Sexual Activity    Alcohol use: Not Currently     Alcohol/week: 4.0 standard drinks of alcohol     Types: 4 Cans of beer per week    Drug use: Never    Sexual activity: Not on file   Other Topics Concern    Not on file   Social History Narrative    Not on file     Social Drivers of Health     Financial Resource Strain: Not on file   Food Insecurity: Not on file   Transportation Needs: Not on file   Physical Activity: Not on file   Stress: Not on file   Social Connections: Not on file   Intimate Partner Violence: Not At Risk (1/9/2024)    Humiliation, Afraid, Rape, and Kick questionnaire     Fear of Current or Ex-Partner: No     Emotionally Abused: No     Physically Abused: No     Sexually Abused: No   Housing Stability: Not on file       Past  Surgical History:   Procedure Laterality Date    OTHER SURGICAL HISTORY  05/16/2022    Appendectomy    OTHER SURGICAL HISTORY  05/16/2022    Nasal septoplasty          Review of Systems   GENERAL: Negative  GI: Negative  MUSCULOSKELETAL: See HPI  SKIN: Negative  NEURO:  Negative     Physical Exam:    General/Constitutional: well appearing, no distress, appears stated age  HEENT: sclera clear  Respiratory: non labored breathing  Vascular: No edema, swelling or tenderness, except as noted in detailed exam.  Integumentary: No impressive skin lesions present, except as noted in detailed exam.  Neurological:  Alert and oriented   Psychological:  Normal mood and affect.  Musculoskeletal: Normal, except as noted in detailed exam and in HPI    Left shoulder: Normal appearance, no skin changes, no muscle atrophy.  There is some generalized tenderness palpation at the shoulder including the subacromial space.  He does have some limited active range of motion, forward flexion 120 degrees, abduction 110 degrees, internal rotation lumbar spine, external rotation 25 degrees.  Passive forward flexion to around 150 degrees with pain and guarding, passive abduction 130 degrees with pain and guarding, passive external rotation 30 degrees       Imaging: X-rays of left shoulder obtained today and independently reviewed, there is moderate degenerative changes of the AC joint, mild degenerative changes of the glenohumeral joint    L Inj/Asp: L subacromial bursa on 12/11/2024 4:38 PM  Indications: pain  Details: 22 G needle, posterior approach  Medications: 40 mg triamcinolone acetonide 40 mg/mL; 2 mL lidocaine 20 mg/mL (2 %)  Outcome: tolerated well, no immediate complications  Procedure, treatment alternatives, risks and benefits explained, specific risks discussed. Consent was given by the patient. Immediately prior to procedure a time out was called to verify the correct patient, procedure, equipment, support staff and site/side  marked as required. Patient was prepped and draped in the usual sterile fashion.               Assessment   1. Impingement syndrome of left shoulder  Referral to Physical Therapy      2. Acute pain of left shoulder  XR shoulder left 2+ views      3. Rotator cuff tendonitis, left  Referral to Physical Therapy            Plan: Left shoulder pain, symptoms consistent with rotator cuff impingement, there is some mild passive range of motion deficits but appears to be more guarding in nature, discussed potential for adhesive capsulitis.  Discussed further workup and treatment.  We did proceed with subacromial injection with Kenalog, referral to physical therapy was placed.  Patient will follow-up if symptoms are ongoing despite conservative management.

## 2024-12-17 NOTE — PROGRESS NOTES
Physical Therapy Evaluation and Treatment      Patient Name: Nicole Kennedy  MRN: [id number]  Today's Date: 12/18/2024  Time Calculation  Start Time: 1120  Stop Time: 1154  Time Calculation (min): 34 min    Therapy Diagnoses:    Problem List Items Addressed This Visit             ICD-10-CM    Impingement syndrome of left shoulder - Primary M75.42    Relevant Orders    Follow Up In Physical Therapy    Rotator cuff tendonitis, left M75.82    Relevant Orders    Follow Up In Physical Therapy       Visit #: 1     Insurance:   Payor: ANTHEM / Plan: ANTHEM HMP / Product Type: *No Product type* / EVAL $52.20 // VISITS ARE MED NEC NO AUTH NEEDED PAYS AT 80% DED MET OOP 3000.00 860.71 APPLIED   Authorization required: no  Authorized visits: na/  Authorized date range: n/a     General:  Reason for visit: L RTC tendonitis   Referred by: MD Kirsten Bartholomew MD appt:  n/a     Precautions:  none  PMH: Diabetes, neuropathy, liver disease; anemia 2 previous L shoulder arthroscopies > 10 years ago;   Fall Risk: None        Assessment:   Nicole Kennedy is a 64 y.o. year old male who participated in a physical therapy evaluation today due to complaint of L shoulder pain/stiffness. Patient reports pain/problem began approximately 6 months ago with insidious onset. Reports previous hx of 2 bone spur removal surgeries in L shoulder. States there is no pain at rest, but increases significantly with activity. Does report frequent n/t in L UE to fingertips. Reports sx have remained the same. States he received cortisone injection in L shoulder which did not help to relieve sx. The patient's findings are consistent with dx of L shoulder pain/stiffness 2/2 competing diagnoses of RTC related pain, frozen shoulder, and cervical radiculopathy. Their impairments include Pain, Active ROM, Passive ROM, Strength, Activity limitations, Decreased functional level, and Participation restrictions. Tissue irritability level is high. Due to these  "impairments the patient is limited in performing Reaching, Lifting, Dressing, Bathing, Sleep is interrupted., and Driving. Activity limitations and participations restrictions include not being able to perform ADLs without discomfort including house/yard work. Nicole will benefit from continued skilled physical therapy as well as development of a home exercise program to address current impairments and progress towards return to their prior level of function without limitations.    Rehab Potential: Good    Clinical Presentation:  Stable and/or uncomplicated characteristics    Level of Complexity: Low      Subjective:  Reason For Visit: Initial Evaluation  - CC: Patient presents to PT with complaint of L shoulder stiffness/pain  - DOI: approx. 6 months  - DOS: No surgery found  - MAR: Insidious onset  - IMAGING: x-ray available XR L shoulder 12/11/24 :\"IMPRESSION:  No acute abnormality. Mild AC osteoarthritis\"      - HX: Reports pain/problem began approximately 6 months ago with insidious onset. Reports previous hx of 2 bone spur removal surgeries in L shoulder. States there is no pain at rest, but increases significantly with activity. Does report frequent n/t in L UE to fingertips. Reports sx have remained the same. States he received cortisone injection in L shoulder which did not help to relieve sx.   - PAIN: CURRENT: (0/10); BEST: (0/10); WORST: (10/10); LOCATION: (L shoulder/neck); Description: sharp; n/t  - ALLEVIATES PAIN:  hot shower  - EXACERBATES PAIN: lifting, reaching, sleeping on L side    - PLOF: Patient reports no functional limitations prior to injury.   - Previous Interventions/Treatments: Injections  - FUNCTIONAL LIMITATIONS: Reaching, Lifting, Dressing, Bathing, Sleep is interrupted., and Driving  - LIVING ENVIRONMENT: reviewed and no concern  - WORK:  retired   - PATIENT'S GOAL:  reduce pain; be able to reach overhead           Red Flags: Do you have any of the following? Partially - N/T in L " UE  Fever/chills, unexplained weight changes, dizziness/fainting, unexplained change in bowel or bladder functions, unexplained malaise or muscle weakness, night pain/sweats, numbness or tingling    Objective:     Cervical AROM screen   Extension - WFL reproduced n/t in L hand   Flexion -  WFL reproduced n/t in L hand   Rotation R - WFL  Rotation L - WFL pain at end range    AROM  Right shoulder flexion:  WFL   Left shoulder flexion: 128 degrees p!   Right shoulder abduction: WFL   Left shoulder abduction: 110 degrees p! 10/10  Right shoulder ER: WFL   Left shoulder ER: 20 degrees   Right HBB: T7     Left HBB: L1  Right HBH: WFL  Left HBH: WFL    PROM  L shoulder flexion moderate limitation firm end feel with pain  L shoulder ABD moderate limitation firm end feel with pain  L shoulder ER - significant limitation firm end feel with pain      MMT  Deltoid flexion right: 5   Deltoid flexion left: 5/5 p!   Deltoid abduction right:  5   Deltoid abduction left: 5 p!   ER @ 0 degrees abduction right: 4+    ER @ 0 degrees abduction left: 4+ p!   IR @ 0 degrees abduction right: 5   IR @ 0 degrees abduction left: 5  ER @ 90 degrees abduction right:    ER @ 90 degrees abduction left: unable to maintain position 2/2 pain      Special Tests  Neer (+)  Hawkin's Norman (-)  Speed's test (-)  Biceps load test 2 L (+)  Painful resisted ER L (+)  Limited capsular pattern with PROM (flexion > ABD > ER) (+) L GH      Outcome Measures  Other Measures  Disability of Arm Shoulder Hand (DASH): 34       Goals:  Short-term goals: (2-4 weeks)  1.) Patient will report reduction in worst reported pain in last 48 hours by 2 points from IE. (IE 10/10)    Long-term goals:  1.) Independent with HEP to maintain improvements in functional capacity and promote return to PLOF.  2.) Reduce worst reported pain within last 48 hours to < 5/10 in order to allow patient to perform daily tasks without limitation/with decreased discomfort.  3.)  Improve R GH ABD/Flexion AROM to >/= 140 degrees in order to allow patient to reach overhead/behind without need for compensatory strategies.  4.) Improve R GH strength to >/= 4+/5 and pain free in order to allow patient to (return to PLOF, perform ADLs, perform perform healthful and wellness activities without limitation/with decreased discomfort).  5.) Report decrease in Quick DASH by greater than or equal to 10 points to meet the MCID (IE 34)        Treatment:   1) Initial evaluation - Low complexity (26 minutes)   2) Patient educated on POC, HEP, and current condition.   3) Treatment Performed:    Therapeutic Exercise:    8 min  L shoulder table slides flexion and ABD x10 5 sec holds each  L GH ER doorway stretch x2 20 sec holds  L UT stretch with towel x2 20 sec holds        4) HEP Provided (See Below)     Access Code: [email or web address]  URL: [email or web address]  Date: 12/18/2024  Prepared by: Justin Simon    Exercises  - Seated Shoulder Flexion Towel Slide at Table Top  - 1 x daily - 7 x weekly - 2 sets - 10 reps - 5 sec hold  - Seated Shoulder Abduction Towel Slide at Table Top  - 1 x daily - 7 x weekly - 2 sets - 10 reps - 5 sec hold  - Seated Upper Trapezius Stretch  - 1 x daily - 7 x weekly - 3 reps - 20-30 sec hold  - Standing Shoulder External Rotation Stretch in Doorway  - 1 x daily - 7 x weekly - 5 reps - 20-30 sec hold     Plan:   Utilize manual therapy techniques at neck and shoulder for decreased muscle guarding and modulation of radicular sx. Progress L GH strength and mobility per patient tolerance.   Recommended Treatment:  Therapeutic exercise, Manual therapy, Home program instruction and progression, Neuromuscular re-education, Therapeutic activities, Self care and home management, Instruction in activity modification, Electrical stimulation, Vasopneumatic device + cold, Cryotherapy, Dry Needling, and Vasopneumatic device    Recommended Visits: 1-2x/wk for (5 visits)     Patient  in agreement with POC.    Justin Simon, PT

## 2024-12-18 ENCOUNTER — EVALUATION (OUTPATIENT)
Dept: PHYSICAL THERAPY | Facility: CLINIC | Age: 64
End: 2024-12-18
Payer: COMMERCIAL

## 2024-12-18 DIAGNOSIS — M75.42 IMPINGEMENT SYNDROME OF LEFT SHOULDER: Primary | ICD-10-CM

## 2024-12-18 DIAGNOSIS — M75.82 ROTATOR CUFF TENDONITIS, LEFT: ICD-10-CM

## 2024-12-18 PROCEDURE — 97110 THERAPEUTIC EXERCISES: CPT | Mod: GP

## 2024-12-18 PROCEDURE — 97161 PT EVAL LOW COMPLEX 20 MIN: CPT | Mod: GP

## 2024-12-18 ASSESSMENT — PATIENT HEALTH QUESTIONNAIRE - PHQ9
2. FEELING DOWN, DEPRESSED OR HOPELESS: NOT AT ALL
SUM OF ALL RESPONSES TO PHQ9 QUESTIONS 1 AND 2: 0
1. LITTLE INTEREST OR PLEASURE IN DOING THINGS: NOT AT ALL

## 2024-12-19 ENCOUNTER — TELEPHONE (OUTPATIENT)
Dept: ORTHOPEDIC SURGERY | Facility: CLINIC | Age: 64
End: 2024-12-19
Payer: COMMERCIAL

## 2024-12-19 DIAGNOSIS — M75.42 IMPINGEMENT SYNDROME OF LEFT SHOULDER: Primary | ICD-10-CM

## 2024-12-19 RX ORDER — MELOXICAM 15 MG/1
15 TABLET ORAL DAILY
Qty: 30 TABLET | Refills: 0 | Status: SHIPPED | OUTPATIENT
Start: 2024-12-19 | End: 2025-01-18

## 2024-12-19 NOTE — TELEPHONE ENCOUNTER
Pt is requesting a prescribation for something for pain, stated after therapy he is a lot of pain.          [id number] - [place], [place] - [address]  [address] [address]  Phone: [phone] Fax: [phone]

## 2024-12-19 NOTE — TELEPHONE ENCOUNTER
Rx for meloxicam sent to pharmacy. Please advise patient not to take any other nsaids (motrin,ibuprofen, aleve) while taking this. He can take tylenol if needed.

## 2024-12-23 ENCOUNTER — APPOINTMENT (OUTPATIENT)
Dept: PHYSICAL THERAPY | Facility: CLINIC | Age: 64
End: 2024-12-23
Payer: COMMERCIAL

## 2024-12-23 ENCOUNTER — TREATMENT (OUTPATIENT)
Dept: PHYSICAL THERAPY | Facility: CLINIC | Age: 64
End: 2024-12-23
Payer: COMMERCIAL

## 2024-12-23 DIAGNOSIS — M75.82 ROTATOR CUFF TENDONITIS, LEFT: ICD-10-CM

## 2024-12-23 DIAGNOSIS — M75.42 IMPINGEMENT SYNDROME OF LEFT SHOULDER: Primary | ICD-10-CM

## 2024-12-23 PROCEDURE — 97140 MANUAL THERAPY 1/> REGIONS: CPT | Mod: GP

## 2024-12-23 PROCEDURE — 97110 THERAPEUTIC EXERCISES: CPT | Mod: GP

## 2024-12-23 NOTE — PROGRESS NOTES
PHYSICAL THERAPY TREATMENT NOTE    Patient Name:  Nicole Kennedy   Patient MRN: [id number]  Date: 12/23/24  Time Calculation  Start Time: 1559  Stop Time: 1640  Time Calculation (min): 41 min      Problem List Items Addressed This Visit             ICD-10-CM    Impingement syndrome of left shoulder - Primary M75.42    Rotator cuff tendonitis, left M75.82       Insurance:  Visit number: 2  Insurance Type: Payor: ANTHEM / Plan: ANTHEM HMP / Product Type: *No Product type* /  EVAL $52.20 // VISITS ARE MED NEC NO AUTH NEEDED PAYS AT 80% DED MET OOP 3000.00 860.71 APPLIED   Authorization required: no  Authorized visits: na/  Authorized date range: n/a      General:  Reason for visit: L RTC tendonitis   Referred by: Rc Carvajal MD   Next MD appt:  n/a      Precautions:  none  PMH: Diabetes, neuropathy, liver disease; anemia 2 previous L shoulder arthroscopies > 10 years ago;   Fall Risk: None    Assessment:  Patient demonstrating/reporting gradual progression towards established goals and improved functional capacity  Education:  Educated on shoulder pulley system; updated HEP  Patient required max cueing including verbal cueing, tactile cueing, and visual demonstration for correct performance of today's interventions. Patient displayed good retention of cueing with patient demonstrating improved performance with performed repetitions without repetition of cues from therapist.     Progress towards functional goals: Reduced pain level.  Response to interventions:  Tolerated treatment session well. Continued to verbalize mild to moderate increase in sx with end range stretching, but patient reported gradual decrease in sx intensity with continued stretching. Mild improvement in available AROM post manual techniques. NO pain with strengthening.   Justification for continued skilled care: To address remaining  "functional, objective and subjective deficits to allow the patient to return to full independence with ADLs. Skilled intervention required to improve ROM which will improve function. Assess effectiveness of HEP. Modify and progress home exercise program.    Plan:  Progress L GH strength and mobility per patient tolerance.    Subjective:   Nicole reports he feels like his condition is improving.  Progress towards functional goal:  Reduced pain level.   Pain (0-10): 8    HEP adherence / understanding: compliance with the instructed home exercises.    Objective:  L GH AROM  Flexion 130 degrees p! End range; 135 degrees   degrees  p! End range      Treatment Performed: (\"NP\" = Not Performed)   Access Code: [email or web address]    Therapeutic Exercise:     23 minutes  UBE 3 min fwd/ 3 min retro L2   Pulleys into flexion and ABD x20 each 5 sec holds  Shoulder extensions 2x12 GTB  Supine L GH flexion AAROM x10 3 sec holds        Manual Therapy:     18 minutes  left GH PROM into flexion, ABD, ER  left GH AP mobs Gr III-IV 3x15  L GH traction x5 30 sec holds       Neuromuscular Re-education:      minutes      Gait Training:            minutes      Aquatics:            minutes      Therapeutic Activity:      minutes      Gait Training:            minutes      Modalities:       Vasopneumatic Device       minutes  Electrical Stimulation          minutes  Ultrasound            minutes  Iontophoresis                     minutes  Cold Pack            minutes  Mechanical Traction           minutes    Self Care Home Management:    minutes    Canalith Reposition:          minutes     Education:          minutes    Other:       minutes      Evaluation Complexity: Low:   minutes; Moderate   minutes; Complex   minutes    Re-Evaluation:   minutes           "

## 2024-12-24 ENCOUNTER — TELEPHONE (OUTPATIENT)
Dept: RADIOLOGY | Facility: HOSPITAL | Age: 64
End: 2024-12-24
Payer: COMMERCIAL

## 2024-12-31 ENCOUNTER — TREATMENT (OUTPATIENT)
Dept: PHYSICAL THERAPY | Facility: CLINIC | Age: 64
End: 2024-12-31
Payer: COMMERCIAL

## 2024-12-31 DIAGNOSIS — M75.42 IMPINGEMENT SYNDROME OF LEFT SHOULDER: Primary | ICD-10-CM

## 2024-12-31 DIAGNOSIS — M75.82 ROTATOR CUFF TENDONITIS, LEFT: ICD-10-CM

## 2024-12-31 PROCEDURE — 97140 MANUAL THERAPY 1/> REGIONS: CPT | Mod: GP

## 2024-12-31 PROCEDURE — 97110 THERAPEUTIC EXERCISES: CPT | Mod: GP

## 2024-12-31 NOTE — PROGRESS NOTES
PHYSICAL THERAPY TREATMENT NOTE    Patient Name:  Nicole Kennedy   Patient MRN: [id number]  Date: 12/31/24  Time Calculation  Start Time: 1250  Stop Time: 1331  Time Calculation (min): 41 min      Problem List Items Addressed This Visit             ICD-10-CM    Impingement syndrome of left shoulder - Primary M75.42    Rotator cuff tendonitis, left M75.82         Insurance:  Visit number: 3  Insurance Type: Payor: ANTHEM / Plan: ANTHEM HMP / Product Type: *No Product type* /  EVAL $52.20 // VISITS ARE MED NEC NO AUTH NEEDED PAYS AT 80% DED MET OOP 3000.00 860.71 APPLIED   Authorization required: no  Authorized visits: na/  Authorized date range: n/a      General:  Reason for visit: L RTC tendonitis   Referred by: Rc Carvajal MD   Next MD appt:  n/a      Precautions:  none  PMH: Diabetes, neuropathy, liver disease; anemia 2 previous L shoulder arthroscopies > 10 years ago;   Fall Risk: None    Subjective:   Nicole reports he feels like his condition is improving. Patient reports  his mobility for his L shoulder is improving, but pain is not improved and still very bothersome. Reports daily activities and exercises consistently increase shoulder pain.    Pain (0-10): 8    HEP adherence / understanding: compliance with the instructed home exercises.    Assessment:   Patient demonstrating and reporting gradual progression towards established goals and improved functional capacity.   Education:  Re-instructed HEP for patient to perform with emphasis on slow tempo and keeping muscles relaxed as his pain seems to be linked to guarded muscle response.   Progress towards functional goals:  Has demonstrated limited improvement in shoulder mobility and no progress towards improved pain levels yet.   Response to interventions:  Tolerated treatment session well. Patient responded very well to manual techniques  "with patient reporting 0/10 pain afterwards and was able to tolerate strengthening well. Did report mild but tolerable discomfort in shoulder with OHP. Still 0/10 pain after session concluded.   Justification for continued skilled care: To address remaining functional, objective and subjective deficits to allow the patient to return to full independence with ADLs. Skilled intervention required to improve ROM which will improve function. Assess effectiveness of HEP. Modify and progress home exercise program. Reduce pain to improve function.    Plan:  Continue with manual techniques as needed for improved ROM and sx modulation 2/2 muscle guarding. Progress strength and mobility per patient tolerance with emphasis on slow tempo movements.    Objective:     L GH AROM  Flexion 132 degrees p! End range  ABD 90 degrees p! End range      Treatment Performed: (\"NP\" = Not Performed)   Access Code: [email or web address]    Therapeutic Exercise:     29 minutes  UBE 3 min fwd/ 3 min retro L2   Supine BILL GH flexion AAROM with dowel 2x12 3 sec holds slow tempo  Seated OHP with Dbs 3# each hand 2x6 3-1-3 tempo  L GH ER vs RTB 3x6 3-1-3 tempo  Shoulder pendulums 10# DB x2 30 sec circular pattern  Not performed today:  Pulleys into flexion and ABD x20 each 5 sec holds  Shoulder extensions 2x12 GTB  Reverse flys  ABD 3-1-3 tempo          Manual Therapy:     12 minutes  left GH PROM into flexion, ABD,  left GH AP mobs Gr III-IV 3x15  L GH traction x5 30 sec holds   R Sidelying L scapulothoracic mobilization glides in all directions 45 seconds    Neuromuscular Re-education:      minutes      Gait Training:            minutes      Aquatics:            minutes      Therapeutic Activity:      minutes      Gait Training:            minutes      Modalities:       Vasopneumatic Device       minutes  Electrical Stimulation          minutes  Ultrasound            minutes  Iontophoresis                     minutes  Cold Pack            minutes  Mechanical " Traction           minutes    Self Care Home Management:    minutes    Canalith Reposition:          minutes     Education:          minutes    Other:       minutes      Evaluation Complexity: Low:   minutes; Moderate   minutes; Complex   minutes    Re-Evaluation:   minutes

## 2025-01-02 ENCOUNTER — TREATMENT (OUTPATIENT)
Dept: PHYSICAL THERAPY | Facility: CLINIC | Age: 65
End: 2025-01-02
Payer: COMMERCIAL

## 2025-01-02 DIAGNOSIS — M75.42 IMPINGEMENT SYNDROME OF LEFT SHOULDER: ICD-10-CM

## 2025-01-02 DIAGNOSIS — M75.82 ROTATOR CUFF TENDONITIS, LEFT: ICD-10-CM

## 2025-01-02 PROCEDURE — 97140 MANUAL THERAPY 1/> REGIONS: CPT | Mod: GP

## 2025-01-02 PROCEDURE — 97110 THERAPEUTIC EXERCISES: CPT | Mod: GP

## 2025-01-02 NOTE — PROGRESS NOTES
PHYSICAL THERAPY TREATMENT NOTE    Patient Name:  Nicole Kennedy   Patient MRN: [id number]  Date: 01/02/25  Time Calculation  Start Time: 1031  Stop Time: 1110  Time Calculation (min): 39 min      Problem List Items Addressed This Visit             ICD-10-CM    Impingement syndrome of left shoulder M75.42    Rotator cuff tendonitis, left M75.82       Insurance:  Visit number: 4  Insurance Type: Payor: ANTHEM / Plan: ANTHEM HMP / Product Type: *No Product type* /  EVAL $52.20 // VISITS ARE MED NEC NO AUTH NEEDED PAYS AT 80% DED MET OOP 3000.00 860.71 APPLIED   Authorization required: no  Authorized visits: na/  Authorized date range: n/a      General:  Reason for visit: L RTC tendonitis   Referred by: MD Kirsten Bartholomew MD appt:  n/a      Precautions:  none  PMH: Diabetes, neuropathy, liver disease; anemia 2 previous L shoulder arthroscopies > 10 years ago;   Fall Risk: None    Subjective:   Nicole reports he feels like his condition is improving. Patient reports  he did some exercise this morning so his shoulder is a little sore, but not as bad as it used to be. Feels pain levels overall are better.    Pain (0-10): 5    HEP adherence / understanding: compliance with the instructed home exercises.    Assessment:   Patient demonstrating limited progression towards established goals and improved functional capacity, but patient is reporting good improvements in pain levels overall. Based on presentation of sx and response to interventions, patient likely dealing with combination of RTC related shoulder pain and frozen shoulder.  Education:  Reinforced HEP; continued to recommend non-aggressive stretching and light strengthening.   Progress towards functional goals:  Patient still presenting with considerable ROM limitations 2/2 pain and stiffness, but is reporting reduced overall pain levels.  "Tissue irritability is still moderate and easy to provoke, but also quick to reduce with gravity/traction.  Response to interventions:  Patient responded fair to manual techniques, with patient reporting change in sx, but unclear if positive or negative. Patient did verbalize total relief of sx with weighted pendulums. Can tolerate flexion movements well, but very low tolerance to movements into ABD. Overall, patient reported reduced sx at end of session.   Justification for continued skilled care: To address remaining functional, objective and subjective deficits to allow the patient to return to full independence with ADLs. Skilled intervention required to improve ROM which will improve function. Assess effectiveness of HEP. Modify and progress home exercise program. Reduce pain to improve function.    Plan:  Attempt to progress tolerance to ABD with oscillatory isometrics. Continue with manual techniques as needed for sx modulation and ROM progression.    Objective:   L GH AROM  Flexion 125 degrees p! End range  ABD 85 degrees p! End range      Treatment Performed: (\"NP\" = Not Performed)   Access Code: [email or web address]    Therapeutic Exercise:     26 minutes  UBE 3 min fwd/ 3 min retro L3   Shoulder pendulums 10# DB x2 60 sec circular pattern  Supine DEION GH flexion AAROM with dowel x20 3 sec holds slow tempo  SL L GH ABD x10 p!  Standing L GH ABD AAROM with dowel x15 p! Past 90 degrees   L GH ABD isometrics x10 5 sec holds  Shoulder extensions 2x12 GTB x 15  Seated OHP with Dbs 3# each hand 2x6 3-1-3 tempo  L GH ER vs RTB 3x6 3-1-3 tempo  Not performed today:  Pulleys into flexion and ABD x20 each 5 sec holds  Reverse flys            Manual Therapy:     13 minutes  left GH PROM into flexion, ABD,  left GH AP mobs Gr III-IV 3x15  L GH traction x5 30 sec holds   R Sidelying L scapulothoracic mobilization glides in all directions 45 seconds    Neuromuscular Re-education:      minutes      Gait Training:            " minutes      Aquatics:            minutes      Therapeutic Activity:      minutes      Gait Training:            minutes      Modalities:       Vasopneumatic Device       minutes  Electrical Stimulation          minutes  Ultrasound            minutes  Iontophoresis                     minutes  Cold Pack            minutes  Mechanical Traction           minutes    Self Care Home Management:    minutes    Canalith Reposition:          minutes     Education:          minutes    Other:       minutes      Evaluation Complexity: Low:   minutes; Moderate   minutes; Complex   minutes    Re-Evaluation:   minutes

## 2025-01-06 ENCOUNTER — TREATMENT (OUTPATIENT)
Dept: PHYSICAL THERAPY | Facility: CLINIC | Age: 65
End: 2025-01-06
Payer: COMMERCIAL

## 2025-01-06 ENCOUNTER — TELEPHONE (OUTPATIENT)
Dept: RADIOLOGY | Facility: HOSPITAL | Age: 65
End: 2025-01-06
Payer: COMMERCIAL

## 2025-01-06 DIAGNOSIS — M75.42 IMPINGEMENT SYNDROME OF LEFT SHOULDER: ICD-10-CM

## 2025-01-06 DIAGNOSIS — M75.82 ROTATOR CUFF TENDONITIS, LEFT: ICD-10-CM

## 2025-01-06 PROCEDURE — 97110 THERAPEUTIC EXERCISES: CPT | Mod: GP

## 2025-01-06 NOTE — TELEPHONE ENCOUNTER
Epic message to the patient provider with regard to the patient holding off on the repeat CT of the chest.

## 2025-01-06 NOTE — PROGRESS NOTES
PHYSICAL THERAPY TREATMENT NOTE    Patient Name:  Nicole Kennedy   Patient MRN: [id number]  Date: 01/06/25  Time Calculation  Start Time: 1033  Stop Time: 1112  Time Calculation (min): 39 min      Problem List Items Addressed This Visit             ICD-10-CM    Impingement syndrome of left shoulder M75.42    Rotator cuff tendonitis, left M75.82         Insurance:  Visit number: 5  Insurance Type: Payor: ANTHEM / Plan: ANTHEM HMP / Product Type: *No Product type* /  EVAL $52.20 // VISITS ARE MED NEC NO AUTH NEEDED PAYS AT 80% DED MET OOP 3000.00 860.71 APPLIED   Authorization required: no  Authorized visits: na/  Authorized date range: n/a      General:  Reason for visit: L RTC tendonitis   Referred by: Rc Carvajal MD   Next MD appt:  n/a      Precautions:  none  PMH: Diabetes, neuropathy, liver disease; anemia 2 previous L shoulder arthroscopies > 10 years ago;   Fall Risk: None    Subjective:   Nicole reports he feels like his condition is not improving. Patient reports  his shoulder pain is still very bothersome and he still has difficulty sleeping, like last night. States some days are better than others, but overall no major difference with the shoulder. Patient reports he was able to shovel snow the other day with no issues, but notices his shoulder did feel a little more irritable the next day.    Pain (0-10): 8    HEP adherence / understanding: compliance with the instructed home exercises.    Assessment:   Patient demonstrating and reporting limited progression towards established goals and improved functional capacity.   Education: Reviewed home exercise program. Answered questions about home exercise program.  Progress towards functional goals: Patient reports there has not been a significant change in functional abilities.  Response to interventions:  Fair tolerance to treatment session.  "Patient verbalized increased pain with all end range positions, with exception to stretching on pulleys. Able to tolerate strengthening well if we remained just below current available end ranges of motion. No change in pain levels by end of session overall.   Justification for continued skilled care: To address remaining functional, objective and subjective deficits to allow the patient to return to full independence with ADLs. Assess effectiveness of HEP. Modify and progress home exercise program.    Plan:  Continue to work on strengthening L GH in limited ranges of motion.    Objective:     L GH AROM  Flexion 119 degrees p! End range (135 degrees)  ABD 95 degrees p! End range    PROM (pulleys)  Near full ROM into flexion displayed    Treatment Performed: (\"NP\" = Not Performed)   Access Code: [email or web address]    Therapeutic Exercise:     39 minutes  UBE 3 min fwd/ 3 min retro L4   Pulleys into flexion and ABD x20 each 5 sec holds  Shoulder pendulums 10# DB 60 sec circular pattern x2  Supine L GH ER stretch with 3# DB x3 30 sec holds (further into ABD each rep)  Supine DEION GH flexion AAROM with dowel x15 3 sec holds slow tempo  Standing banded L GH ABD 2x6 3-1-3 tempo  L GH ER vs RTB 2x6 3-1-3 tempo    SL L GH ABD x10 p!  Standing L GH ABD AAROM with dowel x15 p! Past 90 degrees   L GH ABD isometrics x10 5 sec holds  Shoulder extensions 2x12 GTB x 15  Seated OHP with Dbs 3# each hand 2x6 3-1-3 tempo  Not performed today:  Reverse flys            Manual Therapy:       minutes  left GH PROM into flexion, ABD,  left GH AP mobs Gr III-IV 3x15  L GH traction x5 30 sec holds   R Sidelying L scapulothoracic mobilization glides in all directions 45 seconds    Neuromuscular Re-education:      minutes      Gait Training:            minutes      Aquatics:            minutes      Therapeutic Activity:      minutes      Gait Training:            minutes      Modalities:       Vasopneumatic Device       minutes  Electrical Stimulation   "        minutes  Ultrasound            minutes  Iontophoresis                     minutes  Cold Pack            minutes  Mechanical Traction           minutes    Self Care Home Management:    minutes    Canalith Reposition:          minutes     Education:          minutes    Other:       minutes      Evaluation Complexity: Low:   minutes; Moderate   minutes; Complex   minutes    Re-Evaluation:   minutes

## 2025-01-06 NOTE — TELEPHONE ENCOUNTER
Call placed to the patient in an effort to schedule for his follow up CT of the chest. Per the patient wife the patient is going to hold off on scheduling the repeat CT of the chest.

## 2025-01-08 ENCOUNTER — TREATMENT (OUTPATIENT)
Dept: PHYSICAL THERAPY | Facility: CLINIC | Age: 65
End: 2025-01-08
Payer: COMMERCIAL

## 2025-01-08 DIAGNOSIS — M75.42 IMPINGEMENT SYNDROME OF LEFT SHOULDER: Primary | ICD-10-CM

## 2025-01-08 DIAGNOSIS — M75.82 ROTATOR CUFF TENDONITIS, LEFT: ICD-10-CM

## 2025-01-08 PROCEDURE — 97110 THERAPEUTIC EXERCISES: CPT | Mod: GP

## 2025-01-08 PROCEDURE — 97140 MANUAL THERAPY 1/> REGIONS: CPT | Mod: GP

## 2025-01-08 NOTE — PROGRESS NOTES
PHYSICAL THERAPY TREATMENT NOTE    Patient Name:  Nicole Kennedy   Patient MRN: [id number]  Date: 01/08/25  Time Calculation  Start Time: 1029  Stop Time: 1111  Time Calculation (min): 42 min      Problem List Items Addressed This Visit             ICD-10-CM    Impingement syndrome of left shoulder - Primary M75.42    Relevant Orders    Follow Up In Physical Therapy    Rotator cuff tendonitis, left M75.82    Relevant Orders    Follow Up In Physical Therapy       Insurance:  Visit number: 6  Insurance Type: Payor: ANTHEM / Plan: ANTHEM HMP / Product Type: *No Product type* /  EVAL $52.20 // VISITS ARE MED NEC NO AUTH NEEDED PAYS AT 80% DED MET OOP 3000.00 860.71 APPLIED   Authorization required: no  Authorized visits: na/  Authorized date range: n/a      General:  Reason for visit: L RTC tendonitis   Referred by: MD Kirsten Bartholomew MD appt:  n/a   Updated goals: 1/8/25     Precautions:  none  PMH: Diabetes, neuropathy, liver disease; anemia 2 previous L shoulder arthroscopies > 10 years ago;   Fall Risk: None    Subjective:   Nicole reports he feels like his condition is improving. Patient reports Improved ability to complete household activities. Reduced frequency of pain. Reduced pain level.   Pain (0-10): 0    HEP adherence / understanding: compliance with the instructed home exercises.    Assessment:   Upon re-assessment of objective measures patient demonstrated  limited improvement in  functional capacity evidenced by performance of objective measures and based on report of subjective measures. Patient reports improved pain levels and reduced frequency of intense pain, but still will experience reported 10/10 pain and still presents with limited available AROM in L shoulder. Upon review of goals and objective measures, Nicole Kennedy and SRIRAM made the shared decision to continue with PT  services to further assess and address remaining objective and subjective impairments. Patient denied any remaining questions or concerns for therapist at this time and verbally agreed to updated POC.      Education: Reviewed home exercise program. Updated HEP; emphasis on avoiding pain free ROM and progressing strength in this range.  Progress towards functional goals:  Improved ability to complete household activities. Reduced frequency of pain. Reduced pain level  Response to interventions:  Tolerated treatment session well. Verbalized improved sx post manual techniques. Able to tolerate strengthening interventions well when remaining out of end ranges. Can tolerate movement into end range with minimal resistance. No worse sx by end of session.   Justification for continued skilled care: To address remaining functional, objective and subjective deficits to allow the patient to return to full independence with ADLs. Assess effectiveness of HEP. Modify and progress home exercise program. Reduce pain to improve function.    Plan:  Continue with PT services 1-2x/wk for 6 weeks with Justin. Progress L GH strength in pain free ROM as able.    Objective:      AROM  Right shoulder flexion:  WFL   Left shoulder flexion: 131 degrees p!   Right shoulder abduction: WFL   Left shoulder abduction: 112 degrees p! 10/10  Right shoulder ER: WFL   Left shoulder ER: 24 degrees   Right HBB: T7     Left HBB: L1  Right HBH: WFL  Left HBH: WFL     PROM  L shoulder flexion moderate limitation firm end feel with pain  L shoulder ABD moderate limitation firm end feel with pain  L shoulder ER - significant limitation firm end feel with pain        MMT  Deltoid flexion left: 5/5 p!   Deltoid abduction left: 5 p!   R @ 0 degrees abduction left: 4+ p!   IR @ 0 degrees abduction left: 5  ER @ 90 degrees abduction left: unable to maintain position 2/2 pain             Outcome Measures  Other Measures  Disability of Arm Shoulder Hand (DASH):  "25        Goals:  Short-term goals: (2-4 weeks)  1.) Patient will report reduction in worst reported pain in last 48 hours by 2 points from IE. (IE 10/10) (01/08/25 - not met)       Long-term goals:  1.) Independent with HEP to maintain improvements in functional capacity and promote return to PLOF. (01/08/25 - progressing)  2.) Reduce worst reported pain within last 48 hours to < 5/10 in order to allow patient to perform daily tasks without limitation/with decreased discomfort. (01/08/25 - progressing)  3.) Improve R GH ABD/Flexion AROM to >/= 140 degrees in order to allow patient to reach overhead/behind without need for compensatory strategies. (01/08/25 - progressing)  4.) Improve R GH strength to >/= 4+/5 and pain free in order to allow patient to (return to PLOF, perform ADLs, perform perform healthful and wellness activities without limitation/with decreased discomfort). (01/08/25 - progressing)  5.) Report decrease in Quick DASH by greater than or equal to 10 points to meet the MCID (IE 34) (01/08/25 - progressing)    Treatment Performed: (\"NP\" = Not Performed)   Access Code: [email or web address]    Therapeutic Exercise:     35 minutes  Review of goals and objective measures - 15 minutes   Pulleys into flexion and ABD x20 each 5 sec holds  Supine DEION GH flexion AAROM with dowel x15 3 sec holds slow tempo  Standing RTB  L GH ABD and flexion 2x8  each 3-1-3 tempo  L UT stretch x20 sec  Supine L GH ER stretch with 3# DB x3 30 sec holds (0 degrees ABD)    Not performed today:  Reverse flys  Shoulder pendulums 10# DB 60 sec circular pattern x2  L GH ER vs RTB 2x6 3-1-3 tempo  SL L GH ABD x10 p!          Manual Therapy:     7 minutes  left GH AP mobs Gr III-IV 3x15  L GH traction Gr III 3x10  STM to left cervical paraspinals, UT, scalenes, sub-occipitals      Neuromuscular Re-education:      minutes      Gait Training:            minutes      Aquatics:            minutes      Therapeutic Activity:      minutes      Gait " Training:            minutes      Modalities:       Vasopneumatic Device       minutes  Electrical Stimulation          minutes  Ultrasound            minutes  Iontophoresis                     minutes  Cold Pack            minutes  Mechanical Traction           minutes    Self Care Home Management:    minutes    Canalith Reposition:          minutes     Education:          minutes    Other:       minutes      Evaluation Complexity: Low:   minutes; Moderate   minutes; Complex   minutes    Re-Evaluation:   minutes

## 2025-01-10 ENCOUNTER — APPOINTMENT (OUTPATIENT)
Dept: RADIOLOGY | Facility: CLINIC | Age: 65
End: 2025-01-10
Payer: COMMERCIAL

## 2025-02-03 ENCOUNTER — APPOINTMENT (OUTPATIENT)
Dept: PHYSICAL THERAPY | Facility: CLINIC | Age: 65
End: 2025-02-03
Payer: COMMERCIAL

## 2025-02-10 ENCOUNTER — TREATMENT (OUTPATIENT)
Dept: PHYSICAL THERAPY | Facility: CLINIC | Age: 65
End: 2025-02-10
Payer: COMMERCIAL

## 2025-02-10 DIAGNOSIS — M75.82 ROTATOR CUFF TENDONITIS, LEFT: ICD-10-CM

## 2025-02-10 DIAGNOSIS — M75.42 IMPINGEMENT SYNDROME OF LEFT SHOULDER: ICD-10-CM

## 2025-02-10 PROCEDURE — 97140 MANUAL THERAPY 1/> REGIONS: CPT | Mod: GP

## 2025-02-10 PROCEDURE — 97110 THERAPEUTIC EXERCISES: CPT | Mod: GP

## 2025-02-10 NOTE — PROGRESS NOTES
PHYSICAL THERAPY TREATMENT NOTE    Patient Name:  Nicole Kennedy   Patient MRN: [id number]  Date: 02/10/25  Time Calculation  Start Time: 1024  Stop Time: 1103  Time Calculation (min): 39 min      Problem List Items Addressed This Visit             ICD-10-CM    Impingement syndrome of left shoulder M75.42    Rotator cuff tendonitis, left M75.82         Insurance:  Visit number: 7  Insurance Type: Payor: ANTHEM / Plan: ANTHEM HMP / Product Type: *No Product type* /  EVAL $52.20 // VISITS ARE MED NEC NO AUTH NEEDED PAYS AT 80% DED MET OOP 3000.00 860.71 APPLIED   Authorization required: no  Authorized visits: na/  Authorized date range: n/a      General:  Reason for visit: L RTC tendonitis   Referred by: Rc Carvajal MD   Next MD appt:  n/a   Updated goals: 1/8/25     Precautions:  none  PMH: Diabetes, neuropathy, liver disease; anemia 2 previous L shoulder arthroscopies > 10 years ago;   Fall Risk: None    Subjective:   Nicole reports he feels like his condition is neither improving nor worsening. Patient reports  his shoulder is not feeling any different, for better or for worse, since last visit prior to his vacation. States he consistently performed HEP on cruise, but sx have not improved. States he started wearing pain patch to help manage sx as needed which he says helps a little.    Pain (0-10): 0    HEP adherence / understanding: patient reports compliance with the instructed home exercises.    Assessment:   Patient demonstrating and reporting  mild regression in progress towards improved functional capacity and established goals .  Education: Reviewed home exercise program. Home exercise program instructed and issued. Answered questions about home exercise program. Patient required moderate cueing including verbal cueing, tactile cueing, and visual demonstration for correct performance of  today's interventions. Patient displayed good retention of cueing with patient demonstrating improved performance with performed repetitions without repetition of cues from therapist.     Progress towards functional goals:  Patient demonstrating slight regression in available shoulder AROM and reduced score on subjective outcome measures.   Response to interventions: Tolerated treatment session well without any increase in pain. Improved joint mobility.  Patient was appropriately fatigued with no complaints. Demonstrated improved shoulder mobility with less limitation 2/2 pain by end of session.  Justification for continued skilled care: To address remaining functional, objective and subjective deficits to allow the patient to return to full independence with ADLs. Skilled intervention required to improve ROM which will improve function. Assess effectiveness of HEP. Modify and progress home exercise program.    Plan:  Continue with PT services with emphasis on strengthening in pain free ROM. Intro banded flys, ball wall circles, sled push and pulls with UE emphasis.    Objective:         AROM   Left shoulder flexion: 112 degrees p!; 125 degrees (end of session - less pain reported)   Left shoulder abduction: 93 degrees p! 10/10  Left shoulder ER: 24 degrees   Left HBB: L1  Left HBH: WFL     PROM  L shoulder flexion moderate limitation empty end feel  L shoulder ABD moderate limitation empty end feel  L shoulder ER - significant limitation empty end feel        MMT  Deltoid flexion left: 5/5 p!   Deltoid abduction left: 5 p!   R @ 0 degrees abduction left: 4+ p!   IR @ 0 degrees abduction left: 5  ER @ 90 degrees abduction left: unable to maintain position 2/2 pain             Outcome Measures  Other Measures  Disability of Arm Shoulder Hand (DASH): 31        Goals:  Short-term goals: (2-4 weeks)  1.) Patient will report reduction in worst reported pain in last 48 hours by 2 points from IE. (IE 10/10) (02/10/25 - not  "met)       Long-term goals:  1.) Independent with HEP to maintain improvements in functional capacity and promote return to PLOF. (01/08/25 - progressing) (2/10/25 - progressing)  2.) Reduce worst reported pain within last 48 hours to < 5/10 in order to allow patient to perform daily tasks without limitation/with decreased discomfort. (01/08/25 - progressing) (2/10/25 - progressing)  3.) Improve R GH ABD/Flexion AROM to >/= 140 degrees in order to allow patient to reach overhead/behind without need for compensatory strategies. (01/08/25 - progressing) (2/10/25 - progressing)  4.) Improve R GH strength to >/= 4+/5 and pain free in order to allow patient to (return to PLOF, perform ADLs, perform perform healthful and wellness activities without limitation/with decreased discomfort). (01/08/25 - progressing) (2/10/25 - progressing)  5.) Report decrease in Quick DASH by greater than or equal to 10 points to meet the MCID (IE 34) (01/08/25 - progressing) (2/10/25 - progressing)    Treatment Performed: (\"NP\" = Not Performed)   Access Code: [email or web address]    Therapeutic Exercise:     27 minutes  Review of goals and objective measures - 10 minutes   L GH flexion holding isos 2x10 5 sec holds (3# DB; YTB - better with TB)  L GH ABD holding isos 2x10 5 sec holds  DEION GH shoulder extensions Hi to lo 2x12 vs BluTB  Reverse flys vs TB 2x12 (YTB; RTB)  Shoulder pendulums 5# DB 60 sec circular pattern x2    Not performed today:  L GH ER vs RTB 2x6 3-1-3 tempo  SL L GH ABD x10 p!          Manual Therapy:     12 minutes  left GH AP, inf, and traction mobs Gr III-IV 3x15 each  STM to left cervical paraspinals, UT, scalenes, sub-occipitals      Neuromuscular Re-education:      minutes      Gait Training:            minutes      Aquatics:            minutes      Therapeutic Activity:      minutes      Gait Training:            minutes      Modalities:       Vasopneumatic Device       minutes  Electrical Stimulation          " minutes  Ultrasound            minutes  Iontophoresis                     minutes  Cold Pack            minutes  Mechanical Traction           minutes    Self Care Home Management:    minutes    Canalith Reposition:          minutes     Education:          minutes    Other:       minutes      Evaluation Complexity: Low:   minutes; Moderate   minutes; Complex   minutes    Re-Evaluation:   minutes

## 2025-02-17 ENCOUNTER — TREATMENT (OUTPATIENT)
Dept: PHYSICAL THERAPY | Facility: CLINIC | Age: 65
End: 2025-02-17
Payer: COMMERCIAL

## 2025-02-17 DIAGNOSIS — M75.42 IMPINGEMENT SYNDROME OF LEFT SHOULDER: ICD-10-CM

## 2025-02-17 DIAGNOSIS — M75.82 ROTATOR CUFF TENDONITIS, LEFT: ICD-10-CM

## 2025-02-17 PROCEDURE — 97110 THERAPEUTIC EXERCISES: CPT | Mod: GP

## 2025-02-17 NOTE — PROGRESS NOTES
PHYSICAL THERAPY TREATMENT NOTE    Patient Name:  Nicole Kennedy   Patient MRN: [id number]  Date: 02/17/25  Time Calculation  Start Time: 1034  Stop Time: 1110  Time Calculation (min): 36 min      Problem List Items Addressed This Visit             ICD-10-CM    Impingement syndrome of left shoulder M75.42    Rotator cuff tendonitis, left M75.82           Insurance:  Visit number: 8  Insurance Type: Payor: ANTHEM / Plan: ANTHEM HMP / Product Type: *No Product type* /  EVAL $52.20 // VISITS ARE MED NEC NO AUTH NEEDED PAYS AT 80% DED MET OOP 3000.00 860.71 APPLIED   Authorization required: no  Authorized visits: na/  Authorized date range: n/a      General:  Reason for visit: L RTC tendonitis   Referred by: Rc Carvajal MD   Next MD appt:  n/a   Updated goals: 1/8/25     Precautions:  none  PMH: Diabetes, neuropathy, liver disease; anemia 2 previous L shoulder arthroscopies > 10 years ago;   Fall Risk: None    Subjective:   Nicole reports he feels like his condition is neither improving nor worsening. Patient reports  his shoulder is not feeling any better. Feels fine with rest, but gets intense pain when reaching above shoulder level. Denies any significant or concerning events since last visit.   Pain (0-10): 8 with reaching/lifting.    HEP adherence / understanding: patient reports compliance with the instructed home exercises.    Assessment:   Patient demonstrating and reporting no progress towards established goals and improved functional capacity.   Education: Reviewed home exercise program. Answered questions about home exercise program. Patient required moderate cueing including verbal cueing, tactile cueing, and visual demonstration for correct performance of today's interventions. Patient displayed good retention of cueing with patient demonstrating improved performance with performed  "repetitions without repetition of cues from therapist.     Progress towards functional goals: Patient reports there has not been a significant change in functional abilities.  Response to interventions:  Poor tolerance to treatment session today. Reported increase in radicular sx with all manual techniques in supine and sidelying positions. Able to tolerate light resistance with deltoid/RTC strengthening, but sx consistently worsened after a few reps/increased resistance. Can tolerate scapular strengthening well.   Justification for continued skilled care: To address remaining functional, objective and subjective deficits to allow the patient to return to full independence with ADLs. Assess effectiveness of HEP. Modify and progress home exercise program. Reduce pain to improve function.    Plan:  Consider use of TENS for pain modulation. Continue with manual techniques for pain modulation.    Objective:   L GH AROM  Flexion - 116 degrees   ABD - 90 degrees     Treatment Performed: (\"NP\" = Not Performed)   Access Code: [email or web address]    Therapeutic Exercise:     29 minutes  UBE 3 min fwd/3 min retro L6    Shoulder pendulums 10# DB 60 sec circular pattern x2  L GH flexion holding isos 2x10 5 sec holds RTB  L GH ABD holding isos 2x10 5 sec holds (RTB attempted but too painful - YTB)  DEION GH shoulder extensions Hi to lo 2x12 vs TB (BluTB; BlkTB)  Banded chest press 2x12 (RTB; GTB)  Reverse flys vs TB 2x12 (YTB; RTB)  Ball wall circles 30 sec flexion and ABD positions    Not performed today:  L GH ER vs RTB 2x6 3-1-3 tempo  SL L GH ABD x10 p!  Sled push and pulls          Manual Therapy:     7 minutes  left GH AP, inf, and traction mobs Gr III-IV x15 each  STM to left cervical paraspinals, UT, scalenes, sub-occipitals      Neuromuscular Re-education:      minutes      Gait Training:            minutes      Aquatics:            minutes      Therapeutic Activity:      minutes      Gait Training:            " minutes      Modalities:       Vasopneumatic Device       minutes  Electrical Stimulation          minutes  Ultrasound            minutes  Iontophoresis                     minutes  Cold Pack            minutes  Mechanical Traction           minutes    Self Care Home Management:    minutes    Canalith Reposition:          minutes     Education:          minutes    Other:       minutes      Evaluation Complexity: Low:   minutes; Moderate   minutes; Complex   minutes    Re-Evaluation:   minutes

## 2025-02-19 ENCOUNTER — OFFICE VISIT (OUTPATIENT)
Dept: ORTHOPEDIC SURGERY | Facility: CLINIC | Age: 65
End: 2025-02-19
Payer: COMMERCIAL

## 2025-02-19 VITALS — HEIGHT: 66 IN | BODY MASS INDEX: 30.7 KG/M2 | WEIGHT: 191 LBS

## 2025-02-19 DIAGNOSIS — M25.512 ACUTE PAIN OF LEFT SHOULDER: ICD-10-CM

## 2025-02-19 PROCEDURE — 99213 OFFICE O/P EST LOW 20 MIN: CPT | Performed by: FAMILY MEDICINE

## 2025-02-19 PROCEDURE — 3008F BODY MASS INDEX DOCD: CPT | Performed by: FAMILY MEDICINE

## 2025-02-19 NOTE — PROGRESS NOTES
History of Present Illness   Chief Complaint   Patient presents with    Left Shoulder - Follow-up       The patient is 64 y.o. right-hand-dominant male  here for follow-up of left shoulder pain.  Patient was last seen by me 2 months ago, see previous note for full details.  In brief patient developed some atraumatic shoulder pain around 6 months ago, started at the end of the summer.  At  initial visit there was some limited active range of motion, there is some guarding with passive range of motion but did not appear to have definitive mechanical block to motion suggestive of adhesive capsulitis.  Of note patient has had 2 prior shoulder arthroscopies, last greater than 10 years ago, does not recall rotator cuff repair surgery, thought it was more of a decompression type procedure.  At his initial visit with me he was treated with a subacromial injection and referred to physical therapy.  He says that subacromial injection was a limited to no benefit, he has been doing physical therapy consistently for the past 2 months with minimal improvement, physical therapist recommended that he follow back up with me for further evaluation.  He continues to have some limited active range of motion, pain with attempts at overhead activity, laying on his shoulder at night.  We did try some meloxicam that was of minimal benefit.  Patient is retired, used to work in construction.    Past Medical History:   Diagnosis Date    Personal history of other diseases of the respiratory system 10/24/2015    History of acute bronchitis    Type 2 diabetes mellitus without complications (Multi) 12/01/2022    Type 2 diabetes mellitus       Medication Documentation Review Audit       Reviewed by Rc Carvajal MD (Physician) on 12/11/24 at 1638      Medication Order Taking? Sig Documenting Provider Last Dose Status   atorvastatin (Lipitor) 10 mg tablet [id number]  Take 1 tablet (10 mg) by mouth once daily. Rohan Castillo, DO  Active    blood sugar diagnostic (CareSens N Test Strips) strip [id number]  twice a day. Historical Provider, MD  Active   Bydureon BCise 2 mg/0.85 mL auto-injector [id number]  inject 2 milligram subcutaneously every 7 days Rohan Castillo DO  Active   cholecalciferol (Vitamin D-3) 25 MCG (1000 UT) capsule [id number]  Take 1 capsule (25 mcg) by mouth once daily.   Patient not taking: Reported on 11/12/2024    Rohan Castillo DO  Active   fluticasone (Flonase Sensimist) 27.5 mcg/actuation nasal spray [id number]  Administer 1 spray into each nostril once daily.   Patient not taking: Reported on 11/12/2024    Rohan Castillo DO  Active   glimepiride (Amaryl) 1 mg tablet [id number]  Take 1 tablet (1 mg) by mouth once daily with breakfast. Rohan Castillo DO  Active   metFORMIN  mg 24 hr tablet [id number]  Take 4 tablets (2,000 mg) by mouth once daily. Rohan Castillo DO  Active   pantoprazole (ProtoNix) 40 mg EC tablet [id number]  Take 1 tablet (40 mg) by mouth once daily. Rohan Castillo DO  Active                    No Known Allergies    Social History     Socioeconomic History    Marital status:      Spouse name: Not on file    Number of children: Not on file    Years of education: Not on file    Highest education level: Not on file   Occupational History    Not on file   Tobacco Use    Smoking status: Every Day     Current packs/day: 1.00     Average packs/day: 1 pack/day for 46.1 years (46.1 ttl pk-yrs)     Types: Cigarettes     Start date: 1979    Smokeless tobacco: Never   Substance and Sexual Activity    Alcohol use: Not Currently     Alcohol/week: 4.0 standard drinks of alcohol     Types: 4 Cans of beer per week    Drug use: Never    Sexual activity: Not on file   Other Topics Concern    Not on file   Social History Narrative    Not on file     Social Drivers of Health     Financial Resource Strain: Not on file   Food Insecurity: Not on file   Transportation Needs: Not on file    Physical Activity: Not on file   Stress: Not on file   Social Connections: Not on file   Intimate Partner Violence: Not At Risk (1/9/2024)    Humiliation, Afraid, Rape, and Kick questionnaire     Fear of Current or Ex-Partner: No     Emotionally Abused: No     Physically Abused: No     Sexually Abused: No   Housing Stability: Not on file       Past Surgical History:   Procedure Laterality Date    OTHER SURGICAL HISTORY  05/16/2022    Appendectomy    OTHER SURGICAL HISTORY  05/16/2022    Nasal septoplasty          Review of Systems   GENERAL: Negative  GI: Negative  MUSCULOSKELETAL: See HPI  SKIN: Negative  NEURO:  Negative     Physical Exam:    General/Constitutional: well appearing, no distress, appears stated age  HEENT: sclera clear  Respiratory: non labored breathing  Vascular: No edema, swelling or tenderness, except as noted in detailed exam.  Integumentary: No impressive skin lesions present, except as noted in detailed exam.  Neurological:  Alert and oriented   Psychological:  Normal mood and affect.  Musculoskeletal: Normal, except as noted in detailed exam and in HPI    Left shoulder: Normal appearance, no skin changes, no muscle atrophy.  There is some generalized tenderness palpation at the shoulder including the subacromial space.  He does have some limited active range of motion, forward flexion 120 degrees, abduction 110 degrees, internal rotation lumbar spine, external rotation 25 degrees.  Passive forward flexion to around 150 degrees with pain and guarding, passive abduction 130 degrees with pain and guarding, passive external rotation 30 degrees, all similar to previous exam.  There is no significant weakness with rotator cuff strength testing.       Imaging: no new imaging today, previous x-ray showed some mild degenerative changes of the glenohumeral joint        Assessment   1. Acute pain of left shoulder  MR shoulder left wo IV contrast            Plan: Left shoulder pain, ongoing symptoms  for the past 6 months, there is some limited active range of motion, there is guarding with passive range of motion but cannot definitively say there is a mechanical block to motion but there is a potential for adhesive capsulitis as cause of his symptoms given ongoing discomfort.  He has had minimal response to physical therapy, subacromial injection.  I would like to obtain an MRI of his left shoulder for further evaluation of the shoulder joint, rotator cuff to help guide further treatment.  Patient in agreement with plan.  Further treatment pending MRI results.

## 2025-02-24 ENCOUNTER — DOCUMENTATION (OUTPATIENT)
Dept: PHYSICAL THERAPY | Facility: CLINIC | Age: 65
End: 2025-02-24
Payer: COMMERCIAL

## 2025-02-24 ENCOUNTER — APPOINTMENT (OUTPATIENT)
Dept: PHYSICAL THERAPY | Facility: CLINIC | Age: 65
End: 2025-02-24
Payer: COMMERCIAL

## 2025-02-24 NOTE — PROGRESS NOTES
Physical Therapy    Discharge Summary    Name: Nicole Kennedy  MRN: [id number]  : 1960  Date: 25    Discharge Summary: PT    Discharge Information: Date of discharge 25, Date of last visit 25, Date of evaluation 24, Number of attended visits 8, Referred by Rc Carvajal MD, and Referred for L [facility] tendonitis    Therapy Summary: Unable to formally reassess patient due to patient requesting to cancel remaining appointments. Patient was making minimal improvements in shoulder functional capacity with improved ROM, but no progress in reported shoulder pain with rehab.     Discharge Status: Discharged     Rehab Discharge Reason: Patient requested due to receiving MRI for shoulder.

## 2025-02-24 NOTE — PROGRESS NOTES
Physical Therapy Communication Note    Patient Name: Nicole Kennedy  MRN: [id number]  Today's Date: 2/24/2025     Missed Time: Cancel    Missed Visit Reason: Requested to cancel remaining visits as patient is getting MRI for shoulder.

## 2025-02-24 NOTE — PROGRESS NOTES
"                                                                                                                         PHYSICAL THERAPY TREATMENT NOTE    Patient Name:  Nicole Kennedy   Patient MRN: [id number]  Date: 02/24/25         Problem List Items Addressed This Visit    None            Insurance:  Visit number: Visit count could not be calculated. Make sure you are using a visit which is associated with an episode.  Insurance Type: Payor: ANTHEM / Plan: ANTHEM HMP / Product Type: *No Product type* /  EVAL $52.20 // VISITS ARE MED NEC NO AUTH NEEDED PAYS AT 80% DED MET OOP 3000.00 860.71 APPLIED   Authorization required: no  Authorized visits: na/  Authorized date range: n/a      General:  Reason for visit: L RTC tendonitis   Referred by: Rc Carvajal MD   Next MD appt:  n/a   Updated goals: 1/8/25     Precautions:  none  PMH: Diabetes, neuropathy, liver disease; anemia 2 previous L shoulder arthroscopies > 10 years ago;   Fall Risk: None    Subjective:   {subjective in daily note:83026} Patient reports {Progress towards functional goals:24166}   Pain (0-10): {TJP Pain 0-10:80146}    HEP adherence / understanding: {HEP compliant on reassess:11984::\"patient reports compliance with the instructed home exercises.\"}    Assessment:   Patient demonstrating and reporting {Treatment assessment (regression/ no progress/ progress):96298}. ***  Education: {Education:71678}  Progress towards functional goals: {Progress towards functional goals:52317}  Response to interventions: {Response to intervention:17479}  Justification for continued skilled care: {Justification for continued skilled care:75435}    Plan:  {.Daily note plan.:64669}    Objective:      L GH AROM  Flexion - 116 degrees   ABD - 90 degrees     Treatment Performed: (\"NP\" = Not Performed)   Access Code: [email or web address]    Therapeutic Exercise:       minutes  UBE 3 min fwd/3 min retro L6    Shoulder pendulums 10# DB 60 sec circular pattern x2  L GH flexion holding " isos 2x10 5 sec holds RTB  L GH ABD holding isos 2x10 5 sec holds (RTB attempted but too painful - YTB)  DEION GH shoulder extensions Hi to lo 2x12 vs TB (BluTB; BlkTB)  Banded chest press 2x12 (RTB; GTB)  Reverse flys vs TB 2x12 (YTB; RTB)  Ball wall circles 30 sec flexion and ABD positions    Not performed today:  L GH ER vs RTB 2x6 3-1-3 tempo  SL L GH ABD x10 p!  Sled push and pulls          Manual Therapy:       minutes  left GH AP, inf, and traction mobs Gr III-IV x15 each  STM to left cervical paraspinals, UT, scalenes, sub-occipitals      Neuromuscular Re-education:      minutes      Gait Training:            minutes      Aquatics:            minutes      Therapeutic Activity:      minutes      Gait Training:            minutes      Modalities:       Vasopneumatic Device       minutes  Electrical Stimulation          minutes  Ultrasound            minutes  Iontophoresis                     minutes  Cold Pack            minutes  Mechanical Traction           minutes    Self Care Home Management:    minutes    Canalith Reposition:          minutes     Education:          minutes    Other:       minutes      Evaluation Complexity: Low:   minutes; Moderate   minutes; Complex   minutes    Re-Evaluation:   minutes

## 2025-02-28 ENCOUNTER — HOSPITAL ENCOUNTER (OUTPATIENT)
Dept: RADIOLOGY | Facility: CLINIC | Age: 65
Discharge: HOME | End: 2025-02-28
Payer: COMMERCIAL

## 2025-02-28 DIAGNOSIS — R91.8 OTHER NONSPECIFIC ABNORMAL FINDING OF LUNG FIELD: ICD-10-CM

## 2025-02-28 PROCEDURE — 71250 CT THORAX DX C-: CPT

## 2025-03-03 ENCOUNTER — APPOINTMENT (OUTPATIENT)
Dept: PHYSICAL THERAPY | Facility: CLINIC | Age: 65
End: 2025-03-03
Payer: COMMERCIAL

## 2025-03-06 ENCOUNTER — HOSPITAL ENCOUNTER (OUTPATIENT)
Dept: RADIOLOGY | Facility: CLINIC | Age: 65
End: 2025-03-06
Payer: COMMERCIAL

## 2025-03-10 ENCOUNTER — APPOINTMENT (OUTPATIENT)
Dept: PHYSICAL THERAPY | Facility: CLINIC | Age: 65
End: 2025-03-10
Payer: COMMERCIAL

## 2025-04-07 ENCOUNTER — HOSPITAL ENCOUNTER (OUTPATIENT)
Dept: RADIOLOGY | Facility: HOSPITAL | Age: 65
Discharge: HOME | End: 2025-04-07
Payer: COMMERCIAL

## 2025-04-07 DIAGNOSIS — M25.512 ACUTE PAIN OF LEFT SHOULDER: ICD-10-CM

## 2025-04-07 PROCEDURE — 73221 MRI JOINT UPR EXTREM W/O DYE: CPT | Mod: LT

## 2025-04-07 PROCEDURE — 73221 MRI JOINT UPR EXTREM W/O DYE: CPT | Mod: LEFT SIDE | Performed by: STUDENT IN AN ORGANIZED HEALTH CARE EDUCATION/TRAINING PROGRAM

## 2025-04-23 ENCOUNTER — APPOINTMENT (OUTPATIENT)
Dept: ORTHOPEDIC SURGERY | Facility: CLINIC | Age: 65
End: 2025-04-23
Payer: COMMERCIAL

## 2025-04-30 ENCOUNTER — OFFICE VISIT (OUTPATIENT)
Dept: ORTHOPEDIC SURGERY | Facility: CLINIC | Age: 65
End: 2025-04-30
Payer: COMMERCIAL

## 2025-04-30 DIAGNOSIS — M25.512 ACUTE PAIN OF LEFT SHOULDER: ICD-10-CM

## 2025-04-30 DIAGNOSIS — M75.02 ADHESIVE CAPSULITIS OF LEFT SHOULDER: ICD-10-CM

## 2025-04-30 DIAGNOSIS — M75.42 IMPINGEMENT SYNDROME OF LEFT SHOULDER: Primary | ICD-10-CM

## 2025-04-30 PROCEDURE — 99214 OFFICE O/P EST MOD 30 MIN: CPT | Performed by: FAMILY MEDICINE

## 2025-04-30 NOTE — PROGRESS NOTES
History of Present Illness   Chief Complaint   Patient presents with    Left Shoulder - Follow-up     Discuss MRI       The patient is 64 y.o. right-hand-dominant male  here for follow-up of left shoulder pain/MRI review.  Patient was last seen by me 2 months ago, see previous note for details.  In brief patient has been ill with some atraumatic shoulder pain for the past 8 months.  He has had limited active as well as some passive range of motion, there was some concern for potential he is of capsulitis.  He did do physical therapy with minimal improvement, he has had subacromial injection with minimal change.  He recently had MRI and is here today for review.  Symptoms are unchanged over the past 2 months.  He continues to have pain with laying on his shoulder at night, overhead activity.  He denies any new injuries or symptoms.  He has been taking over-the-counter medications for pain, to try some meloxicam that was of limited benefit.  Patient with history of 2 prior shoulder arthroscopies greater than 10 years ago without definitive rotator cuff repair, describes more as a subacromial decompression.  Did well following this until this past year. Patient is retired, used to work in construction.    Past Medical History:   Diagnosis Date    Personal history of other diseases of the respiratory system 10/24/2015    History of acute bronchitis    Type 2 diabetes mellitus without complications (Multi) 12/01/2022    Type 2 diabetes mellitus       Medication Documentation Review Audit       Reviewed by Rc Carvajal MD (Physician) on 02/19/25 at 1642      Medication Order Taking? Sig Documenting Provider Last Dose Status   atorvastatin (Lipitor) 10 mg tablet [id number]  Take 1 tablet (10 mg) by mouth once daily. Rohan Castillo, DO  Active   blood sugar diagnostic (CareSens N Test Strips) strip [id number]  twice a day. Historical Provider, MD  Active   Bydureon BCise 2 mg/0.85 mL auto-injector [id number]  inject 2  milligram subcutaneously every 7 days Rohan Castillo DO  Active   cholecalciferol (Vitamin D-3) 25 MCG (1000 UT) capsule [id number]  Take 1 capsule (25 mcg) by mouth once daily.   Patient not taking: Reported on 11/12/2024    Rohan Castillo DO  Active   fluticasone (Flonase Sensimist) 27.5 mcg/actuation nasal spray [id number]  Administer 1 spray into each nostril once daily.   Patient not taking: Reported on 11/12/2024    Rohan Castillo DO  Active   glimepiride (Amaryl) 1 mg tablet [id number]  Take 1 tablet (1 mg) by mouth once daily with breakfast. Rohan Castillo DO  Active   metFORMIN  mg 24 hr tablet [id number]  Take 4 tablets (2,000 mg) by mouth once daily. Rohan Castillo DO  Active   pantoprazole (ProtoNix) 40 mg EC tablet [id number]  Take 1 tablet (40 mg) by mouth once daily. Rohan Castillo DO  Active                    No Known Allergies    Social History     Socioeconomic History    Marital status:      Spouse name: Not on file    Number of children: Not on file    Years of education: Not on file    Highest education level: Not on file   Occupational History    Not on file   Tobacco Use    Smoking status: Every Day     Current packs/day: 1.00     Average packs/day: 1 pack/day for 46.3 years (46.3 ttl pk-yrs)     Types: Cigarettes     Start date: 1979    Smokeless tobacco: Never   Substance and Sexual Activity    Alcohol use: Not Currently     Alcohol/week: 4.0 standard drinks of alcohol     Types: 4 Cans of beer per week    Drug use: Never    Sexual activity: Not on file   Other Topics Concern    Not on file   Social History Narrative    Not on file     Social Drivers of Health     Financial Resource Strain: Not on file   Food Insecurity: Not on file   Transportation Needs: Not on file   Physical Activity: Not on file   Stress: Not on file   Social Connections: Not on file   Intimate Partner Violence: Not At Risk (1/9/2024)    Humiliation, Afraid, Rape, and  Kick questionnaire     Fear of Current or Ex-Partner: No     Emotionally Abused: No     Physically Abused: No     Sexually Abused: No   Housing Stability: Not on file       Past Surgical History:   Procedure Laterality Date    OTHER SURGICAL HISTORY  05/16/2022    Appendectomy    OTHER SURGICAL HISTORY  05/16/2022    Nasal septoplasty          Review of Systems   GENERAL: Negative  GI: Negative  MUSCULOSKELETAL: See HPI  SKIN: Negative  NEURO:  Negative     Physical Exam:    General/Constitutional: well appearing, no distress, appears stated age  HEENT: sclera clear  Respiratory: non labored breathing  Vascular: No edema, swelling or tenderness, except as noted in detailed exam.  Integumentary: No impressive skin lesions present, except as noted in detailed exam.  Neurological:  Alert and oriented   Psychological:  Normal mood and affect.  Musculoskeletal: Normal, except as noted in detailed exam and in HPI    Left shoulder: Normal appearance, no skin changes, no muscle atrophy.  There is some generalized tenderness palpation at the shoulder including the subacromial space.  Continued limited active range of motion, forward flexion 120 degrees, abduction 110 degrees, internal rotation lumbar spine, external rotation 25 degrees.  Passive forward flexion to around 150 degrees with pain and guarding, passive abduction 130 degrees with pain and guarding, passive external rotation 30 degrees, all similar to previous exam.  There is no significant weakness with rotator cuff strength testing.       Imaging: MRI of left shoulder was reviewed patient's, there is a low-grade partial-thickness tear of the distal supraspinatus tendon, there is some tendinopathy of the proximal biceps tendon.  No significant degenerative changes of the glenohumeral joint.        Assessment   1. Impingement syndrome of left shoulder        2. Adhesive capsulitis of left shoulder        3. Acute pain of left shoulder                Plan: Left  shoulder pain, ongoing symptoms for the past 8 months, he has some decreased active and passive range of motion albeit somewhat more mild.  MRI shows some very minimal partial-thickness tearing of the supraspinatus which does not fully correlate with his symptoms, symptoms more consistent with adhesive capsulitis based on passive range of motion deficits, relatively benign MRI findings, no significant degenerative changes of the glenohumeral joint.  We discussed further workup and treatment.  We explained that if this is truly frozen shoulder will take additional time to fully resolve potentially of another 4 to 6 months or longer.  We discussed consideration of glenohumeral joint injection though tends to be less effective this far out from initial diagnosis.  We also discussed follow-up with my surgical colleague Dr. Tavares for further MRI review to determine if he thinks small rotator cuff tear could be culprit and if he would benefit from any potential surgical intervention.  Patient would like to see Dr. Tavares for second opinion on his shoulder, all questions and concerns were answered.

## 2025-05-13 ENCOUNTER — OFFICE VISIT (OUTPATIENT)
Dept: ORTHOPEDIC SURGERY | Facility: CLINIC | Age: 65
End: 2025-05-13
Payer: COMMERCIAL

## 2025-05-13 VITALS — WEIGHT: 191 LBS | HEIGHT: 66 IN | BODY MASS INDEX: 30.7 KG/M2

## 2025-05-13 DIAGNOSIS — S46.012A ROTATOR CUFF STRAIN, LEFT, INITIAL ENCOUNTER: Primary | ICD-10-CM

## 2025-05-13 PROCEDURE — 99204 OFFICE O/P NEW MOD 45 MIN: CPT | Performed by: ORTHOPAEDIC SURGERY

## 2025-05-13 PROCEDURE — 3008F BODY MASS INDEX DOCD: CPT | Performed by: ORTHOPAEDIC SURGERY

## 2025-05-13 PROCEDURE — 99214 OFFICE O/P EST MOD 30 MIN: CPT | Performed by: ORTHOPAEDIC SURGERY

## 2025-05-13 RX ORDER — CEFAZOLIN SODIUM 2 G/100ML
2 INJECTION, SOLUTION INTRAVENOUS ONCE
OUTPATIENT
Start: 2025-05-13 | End: 2025-05-13

## 2025-05-13 NOTE — PROGRESS NOTES
64-year-old is seen with left shoulder pain.  He has been having intermittent severe sharp shooting pain in the left shoulder.  He has difficulty with overhead reaching and lifting activities.  He has been having worsening pain over the last 6 months.  He has had 2 prior left shoulder arthroscopic procedures by Dr. Marks.  In 2008 he had arthroscopy with debridement and subacromial decompression and in 2010 he had a similar procedure.  He had done well since the second procedure until now.  He is having increased difficulties with daily activities.  He has also had a right shoulder arthroscopy and is doing well with this.  He has done physical therapy and has had cortisone injection and he is taken meloxicam but no significant improvement.    Pleasant and no acute distress.  Left shoulder forward flexion 160°. No effusion or instability. There is positive Neer and Escalante impingement. There is subacromial crepitus and tenderness around the subacromial space.  There is biceps tenderness. There is a positive Archuleta's test. No acromioclavicular tenderness. Rotator cuff strength is decreased and there is discomfort with strength testing. Right shoulder forward flexion 180°. No effusion or instability. No impingement or crepitus or tenderness involving the subacromial space. No biceps or acromioclavicular tenderness. There is intact rotator cuff strength. There is adequate range of motion of the cervical spine without pain. Both upper extremities are well perfused skin is intact and muscle tone is adequate. Elbow flexion and extension and wrist flexion and extension strength are intact.    Multiple x-ray views of the left shoulder are personally reviewed and there is no acute bony abnormality.    MRI of the left shoulder was personally reviewed and there is partial articular sided tearing involving the supraspinatus tendon.  There is acromioclavicular arthrosis.  There is tearing involving the labrum.  There is signal  change within the bicep tendon.    A detailed discussion about his shoulder and the partial-thickness rotator cuff tear was done.  Treatment options including no treatment reviewed and the decision was made to proceed with left shoulder arthroscopy with treatment of the rotator cuff as needed with debridement or repair and debridement and subacromial decompression.  The surgery and postoperative course were discussed in detail.  Difference in postoperative course with repair versus debridement was reviewed.  Risks include not limited to infection thromboembolus neurovascular injury medical problems stiffness and realistic expectations were discussed and he understands this and has elected to proceed.  He will avoid aggravating activities in the meantime.

## 2025-05-30 ENCOUNTER — APPOINTMENT (OUTPATIENT)
Dept: PRIMARY CARE | Facility: CLINIC | Age: 65
End: 2025-05-30
Payer: COMMERCIAL

## 2025-10-01 ENCOUNTER — APPOINTMENT (OUTPATIENT)
Dept: PRIMARY CARE | Facility: CLINIC | Age: 65
End: 2025-10-01
Payer: COMMERCIAL

## 2025-10-15 ENCOUNTER — APPOINTMENT (OUTPATIENT)
Dept: PRIMARY CARE | Facility: CLINIC | Age: 65
End: 2025-10-15
Payer: COMMERCIAL

## 2025-10-21 ENCOUNTER — APPOINTMENT (OUTPATIENT)
Dept: PRIMARY CARE | Facility: CLINIC | Age: 65
End: 2025-10-21
Payer: COMMERCIAL